# Patient Record
Sex: MALE | Race: WHITE | NOT HISPANIC OR LATINO | Employment: PART TIME | URBAN - METROPOLITAN AREA
[De-identification: names, ages, dates, MRNs, and addresses within clinical notes are randomized per-mention and may not be internally consistent; named-entity substitution may affect disease eponyms.]

---

## 2021-06-09 ENCOUNTER — HISTORICAL (OUTPATIENT)
Dept: ADMINISTRATIVE | Facility: HOSPITAL | Age: 41
End: 2021-06-09

## 2021-06-09 LAB
ABS NEUT (OLG): 5.45 X10(3)/MCL (ref 2.1–9.2)
ALBUMIN SERPL-MCNC: 4.1 GM/DL (ref 3.5–5)
ALBUMIN/GLOB SERPL: 1 RATIO (ref 1.1–2)
ALP SERPL-CCNC: 83 UNIT/L (ref 40–150)
ALT SERPL-CCNC: 94 UNIT/L (ref 0–55)
AST SERPL-CCNC: 71 UNIT/L (ref 5–34)
BASOPHILS # BLD AUTO: 0.1 X10(3)/MCL (ref 0–0.2)
BASOPHILS NFR BLD AUTO: 1 %
BILIRUB SERPL-MCNC: 0.7 MG/DL
BILIRUBIN DIRECT+TOT PNL SERPL-MCNC: 0.3 MG/DL (ref 0–0.5)
BILIRUBIN DIRECT+TOT PNL SERPL-MCNC: 0.4 MG/DL (ref 0–0.8)
BUN SERPL-MCNC: 17.8 MG/DL (ref 8.9–20.6)
CALCIUM SERPL-MCNC: 10.4 MG/DL (ref 8.4–10.2)
CHLORIDE SERPL-SCNC: 102 MMOL/L (ref 98–107)
CO2 SERPL-SCNC: 28 MMOL/L (ref 22–29)
CREAT SERPL-MCNC: 1.52 MG/DL (ref 0.73–1.18)
EOSINOPHIL # BLD AUTO: 0.1 X10(3)/MCL (ref 0–0.9)
EOSINOPHIL NFR BLD AUTO: 1 %
ERYTHROCYTE [DISTWIDTH] IN BLOOD BY AUTOMATED COUNT: 12.1 % (ref 11.5–14.5)
FERRITIN SERPL-MCNC: 172.17 NG/ML (ref 21.81–274.66)
GLOBULIN SER-MCNC: 4 GM/DL (ref 2.4–3.5)
GLUCOSE SERPL-MCNC: 96 MG/DL (ref 74–100)
HAV AB SER QL IA: NONREACTIVE
HAV IGM SERPL QL IA: NONREACTIVE
HBV CORE AB SERPL QL IA: NONREACTIVE
HBV SURFACE AB SER-ACNC: 49.97 MIU/ML
HBV SURFACE AB SERPL IA-ACNC: REACTIVE M[IU]/ML
HBV SURFACE AG SERPL QL IA: NONREACTIVE
HCT VFR BLD AUTO: 54.2 % (ref 40–51)
HGB BLD-MCNC: 17.7 GM/DL (ref 13.5–17.5)
HIV 1+2 AB+HIV1 P24 AG SERPL QL IA: NONREACTIVE
IMM GRANULOCYTES # BLD AUTO: 0.02 10*3/UL
IMM GRANULOCYTES NFR BLD AUTO: 0 %
INR PPP: 1.01 (ref 0.9–1.2)
LYMPHOCYTES # BLD AUTO: 1.9 X10(3)/MCL (ref 0.6–4.6)
LYMPHOCYTES NFR BLD AUTO: 23 %
MCH RBC QN AUTO: 29.5 PG (ref 26–34)
MCHC RBC AUTO-ENTMCNC: 32.7 GM/DL (ref 31–37)
MCV RBC AUTO: 90.5 FL (ref 80–100)
MONOCYTES # BLD AUTO: 0.8 X10(3)/MCL (ref 0.1–1.3)
MONOCYTES NFR BLD AUTO: 9 %
NEUTROPHILS # BLD AUTO: 5.45 X10(3)/MCL (ref 2.1–9.2)
NEUTROPHILS NFR BLD AUTO: 66 %
NRBC BLD AUTO-RTO: 0 % (ref 0–0.2)
PLATELET # BLD AUTO: 188 X10(3)/MCL (ref 130–400)
PMV BLD AUTO: 11.6 FL (ref 7.4–10.4)
POTASSIUM SERPL-SCNC: 4.5 MMOL/L (ref 3.5–5.1)
PROT SERPL-MCNC: 8.1 GM/DL (ref 6.4–8.3)
PROTHROMBIN TIME: 13.1 SECOND(S) (ref 11.9–14.4)
RBC # BLD AUTO: 5.99 X10(6)/MCL (ref 4.5–5.9)
SODIUM SERPL-SCNC: 138 MMOL/L (ref 136–145)
T PALLIDUM AB SER QL: NONREACTIVE
WBC # SPEC AUTO: 8.3 X10(3)/MCL (ref 4.5–11)

## 2021-06-21 ENCOUNTER — HISTORICAL (OUTPATIENT)
Dept: RADIOLOGY | Facility: HOSPITAL | Age: 41
End: 2021-06-21

## 2021-07-01 ENCOUNTER — HISTORICAL (OUTPATIENT)
Dept: ADMINISTRATIVE | Facility: HOSPITAL | Age: 41
End: 2021-07-01

## 2021-07-01 LAB
ABS NEUT (OLG): 6.15 X10(3)/MCL (ref 2.1–9.2)
ALBUMIN SERPL-MCNC: 4 GM/DL (ref 3.5–5)
ALBUMIN/GLOB SERPL: 1.1 RATIO (ref 1.1–2)
ALP SERPL-CCNC: 84 UNIT/L (ref 40–150)
ALT SERPL-CCNC: 77 UNIT/L (ref 0–55)
AST SERPL-CCNC: 60 UNIT/L (ref 5–34)
BASOPHILS # BLD AUTO: 0.1 X10(3)/MCL (ref 0–0.2)
BASOPHILS NFR BLD AUTO: 1 %
BILIRUB SERPL-MCNC: 0.4 MG/DL
BILIRUBIN DIRECT+TOT PNL SERPL-MCNC: 0.2 MG/DL (ref 0–0.5)
BILIRUBIN DIRECT+TOT PNL SERPL-MCNC: 0.2 MG/DL (ref 0–0.8)
BUN SERPL-MCNC: 14.1 MG/DL (ref 8.9–20.6)
CALCIUM SERPL-MCNC: 10 MG/DL (ref 8.4–10.2)
CHLORIDE SERPL-SCNC: 103 MMOL/L (ref 98–107)
CO2 SERPL-SCNC: 31 MMOL/L (ref 22–29)
CREAT SERPL-MCNC: 1.5 MG/DL (ref 0.73–1.18)
EOSINOPHIL # BLD AUTO: 0.1 X10(3)/MCL (ref 0–0.9)
EOSINOPHIL NFR BLD AUTO: 1 %
ERYTHROCYTE [DISTWIDTH] IN BLOOD BY AUTOMATED COUNT: 12.4 % (ref 11.5–14.5)
GLOBULIN SER-MCNC: 3.6 GM/DL (ref 2.4–3.5)
GLUCOSE SERPL-MCNC: 86 MG/DL (ref 74–100)
HCT VFR BLD AUTO: 50.4 % (ref 40–51)
HGB BLD-MCNC: 16.6 GM/DL (ref 13.5–17.5)
IMM GRANULOCYTES # BLD AUTO: 0.02 10*3/UL
IMM GRANULOCYTES NFR BLD AUTO: 0 %
LYMPHOCYTES # BLD AUTO: 2.2 X10(3)/MCL (ref 0.6–4.6)
LYMPHOCYTES NFR BLD AUTO: 23 %
MCH RBC QN AUTO: 30 PG (ref 26–34)
MCHC RBC AUTO-ENTMCNC: 32.9 GM/DL (ref 31–37)
MCV RBC AUTO: 91 FL (ref 80–100)
MONOCYTES # BLD AUTO: 1 X10(3)/MCL (ref 0.1–1.3)
MONOCYTES NFR BLD AUTO: 11 %
NEUTROPHILS # BLD AUTO: 6.15 X10(3)/MCL (ref 2.1–9.2)
NEUTROPHILS NFR BLD AUTO: 64 %
NRBC BLD AUTO-RTO: 0 % (ref 0–0.2)
PLATELET # BLD AUTO: 190 X10(3)/MCL (ref 130–400)
PMV BLD AUTO: 11.7 FL (ref 7.4–10.4)
POTASSIUM SERPL-SCNC: 4.1 MMOL/L (ref 3.5–5.1)
PROT SERPL-MCNC: 7.6 GM/DL (ref 6.4–8.3)
RBC # BLD AUTO: 5.54 X10(6)/MCL (ref 4.5–5.9)
SODIUM SERPL-SCNC: 141 MMOL/L (ref 136–145)
WBC # SPEC AUTO: 9.6 X10(3)/MCL (ref 4.5–11)

## 2023-01-01 ENCOUNTER — LAB VISIT (OUTPATIENT)
Dept: LAB | Facility: HOSPITAL | Age: 43
End: 2023-01-01
Attending: NURSE PRACTITIONER
Payer: MEDICAID

## 2023-01-01 ENCOUNTER — TELEPHONE (OUTPATIENT)
Dept: INFECTIOUS DISEASES | Facility: CLINIC | Age: 43
End: 2023-01-01
Payer: MEDICAID

## 2023-01-01 ENCOUNTER — TELEPHONE (OUTPATIENT)
Dept: TRANSPLANT | Facility: CLINIC | Age: 43
End: 2023-01-01
Payer: MEDICAID

## 2023-01-01 ENCOUNTER — HOSPITAL ENCOUNTER (OUTPATIENT)
Dept: RADIOLOGY | Facility: HOSPITAL | Age: 43
Discharge: HOME | End: 2023-03-14
Attending: STUDENT IN AN ORGANIZED HEALTH CARE EDUCATION/TRAINING PROGRAM
Payer: MEDICAID

## 2023-01-01 ENCOUNTER — OFFICE VISIT (OUTPATIENT)
Dept: UROLOGY | Facility: CLINIC | Age: 43
End: 2023-01-01
Payer: MEDICAID

## 2023-01-01 ENCOUNTER — TELEPHONE (OUTPATIENT)
Dept: UROLOGY | Facility: CLINIC | Age: 43
End: 2023-01-01
Payer: MEDICAID

## 2023-01-01 ENCOUNTER — DOCUMENTATION ONLY (OUTPATIENT)
Dept: INFECTIOUS DISEASES | Facility: CLINIC | Age: 43
End: 2023-01-01
Payer: MEDICAID

## 2023-01-01 ENCOUNTER — HOSPITAL ENCOUNTER (OUTPATIENT)
Dept: RADIOLOGY | Facility: HOSPITAL | Age: 43
Discharge: HOME | End: 2023-05-01
Attending: NURSE PRACTITIONER
Payer: MEDICAID

## 2023-01-01 ENCOUNTER — HOSPITAL ENCOUNTER (EMERGENCY)
Facility: HOSPITAL | Age: 43
End: 2023-11-10
Attending: EMERGENCY MEDICINE
Payer: MEDICAID

## 2023-01-01 ENCOUNTER — CLINICAL SUPPORT (OUTPATIENT)
Dept: INFECTIOUS DISEASES | Facility: CLINIC | Age: 43
End: 2023-01-01
Payer: MEDICAID

## 2023-01-01 ENCOUNTER — HOSPITAL ENCOUNTER (EMERGENCY)
Facility: HOSPITAL | Age: 43
Discharge: HOME | End: 2023-03-30
Attending: INTERNAL MEDICINE
Payer: MEDICAID

## 2023-01-01 ENCOUNTER — PROCEDURE VISIT (OUTPATIENT)
Dept: INFECTIOUS DISEASES | Facility: CLINIC | Age: 43
End: 2023-01-01
Payer: MEDICAID

## 2023-01-01 ENCOUNTER — OFFICE VISIT (OUTPATIENT)
Dept: INFECTIOUS DISEASES | Facility: CLINIC | Age: 43
End: 2023-01-01
Payer: MEDICAID

## 2023-01-01 ENCOUNTER — TELEPHONE (OUTPATIENT)
Dept: INFECTIOUS DISEASES | Facility: CLINIC | Age: 43
End: 2023-01-01

## 2023-01-01 ENCOUNTER — HOSPITAL ENCOUNTER (OUTPATIENT)
Facility: HOSPITAL | Age: 43
Discharge: HOME | End: 2023-06-22
Attending: INTERNAL MEDICINE | Admitting: INTERNAL MEDICINE
Payer: MEDICAID

## 2023-01-01 ENCOUNTER — OFFICE VISIT (OUTPATIENT)
Dept: INTERNAL MEDICINE | Facility: CLINIC | Age: 43
End: 2023-01-01
Payer: MEDICAID

## 2023-01-01 ENCOUNTER — HOSPITAL ENCOUNTER (OUTPATIENT)
Dept: CARDIOLOGY | Facility: HOSPITAL | Age: 43
Discharge: HOME | End: 2023-09-29
Attending: INTERNAL MEDICINE
Payer: MEDICAID

## 2023-01-01 ENCOUNTER — HOSPITAL ENCOUNTER (OUTPATIENT)
Facility: HOSPITAL | Age: 43
Discharge: HOME | End: 2023-07-20
Attending: STUDENT IN AN ORGANIZED HEALTH CARE EDUCATION/TRAINING PROGRAM | Admitting: STUDENT IN AN ORGANIZED HEALTH CARE EDUCATION/TRAINING PROGRAM
Payer: MEDICAID

## 2023-01-01 VITALS
HEART RATE: 100 BPM | BODY MASS INDEX: 27.82 KG/M2 | TEMPERATURE: 98 F | RESPIRATION RATE: 20 BRPM | WEIGHT: 177.25 LBS | SYSTOLIC BLOOD PRESSURE: 147 MMHG | HEIGHT: 67 IN | OXYGEN SATURATION: 98 % | DIASTOLIC BLOOD PRESSURE: 87 MMHG

## 2023-01-01 VITALS
DIASTOLIC BLOOD PRESSURE: 98 MMHG | SYSTOLIC BLOOD PRESSURE: 156 MMHG | WEIGHT: 178.81 LBS | HEIGHT: 67 IN | HEART RATE: 92 BPM | OXYGEN SATURATION: 99 % | TEMPERATURE: 98 F | HEART RATE: 89 BPM | RESPIRATION RATE: 16 BRPM | SYSTOLIC BLOOD PRESSURE: 159 MMHG | RESPIRATION RATE: 20 BRPM | DIASTOLIC BLOOD PRESSURE: 104 MMHG | OXYGEN SATURATION: 98 % | TEMPERATURE: 97 F | BODY MASS INDEX: 28.07 KG/M2 | WEIGHT: 175 LBS

## 2023-01-01 VITALS
SYSTOLIC BLOOD PRESSURE: 143 MMHG | HEART RATE: 84 BPM | RESPIRATION RATE: 18 BRPM | TEMPERATURE: 98 F | HEIGHT: 67 IN | DIASTOLIC BLOOD PRESSURE: 98 MMHG | WEIGHT: 171.94 LBS | BODY MASS INDEX: 26.99 KG/M2 | OXYGEN SATURATION: 99 %

## 2023-01-01 VITALS
SYSTOLIC BLOOD PRESSURE: 146 MMHG | DIASTOLIC BLOOD PRESSURE: 105 MMHG | HEIGHT: 67 IN | BODY MASS INDEX: 26.21 KG/M2 | WEIGHT: 167 LBS

## 2023-01-01 VITALS
TEMPERATURE: 98 F | WEIGHT: 168 LBS | RESPIRATION RATE: 20 BRPM | DIASTOLIC BLOOD PRESSURE: 79 MMHG | BODY MASS INDEX: 26.37 KG/M2 | OXYGEN SATURATION: 96 % | HEIGHT: 67 IN | SYSTOLIC BLOOD PRESSURE: 120 MMHG | HEART RATE: 85 BPM

## 2023-01-01 VITALS — BODY MASS INDEX: 28.25 KG/M2 | TEMPERATURE: 32 F | HEIGHT: 67 IN | WEIGHT: 180 LBS | RESPIRATION RATE: 12 BRPM

## 2023-01-01 VITALS
BODY MASS INDEX: 28.09 KG/M2 | TEMPERATURE: 98 F | HEART RATE: 96 BPM | DIASTOLIC BLOOD PRESSURE: 90 MMHG | HEIGHT: 67 IN | WEIGHT: 179 LBS | RESPIRATION RATE: 18 BRPM | SYSTOLIC BLOOD PRESSURE: 146 MMHG

## 2023-01-01 DIAGNOSIS — B18.2 CHRONIC HEPATITIS C WITHOUT HEPATIC COMA: ICD-10-CM

## 2023-01-01 DIAGNOSIS — R55 NEAR SYNCOPE: Primary | ICD-10-CM

## 2023-01-01 DIAGNOSIS — Z23 NEED FOR VACCINATION: ICD-10-CM

## 2023-01-01 DIAGNOSIS — N50.812 TESTICULAR PAIN, LEFT: ICD-10-CM

## 2023-01-01 DIAGNOSIS — I10 WHITE COAT SYNDROME WITH DIAGNOSIS OF HYPERTENSION: ICD-10-CM

## 2023-01-01 DIAGNOSIS — R55 SYNCOPE AND COLLAPSE: ICD-10-CM

## 2023-01-01 DIAGNOSIS — Z23 NEED FOR VACCINATION: Primary | ICD-10-CM

## 2023-01-01 DIAGNOSIS — N40.1 BENIGN PROSTATIC HYPERPLASIA WITH WEAK URINARY STREAM: Primary | ICD-10-CM

## 2023-01-01 DIAGNOSIS — B19.20 HEPATITIS C VIRUS INFECTION WITHOUT HEPATIC COMA, UNSPECIFIED CHRONICITY: ICD-10-CM

## 2023-01-01 DIAGNOSIS — R55 SYNCOPE: ICD-10-CM

## 2023-01-01 DIAGNOSIS — B18.2 CHRONIC HEPATITIS C WITHOUT HEPATIC COMA: Primary | ICD-10-CM

## 2023-01-01 DIAGNOSIS — Z79.899 POLYPHARMACY: ICD-10-CM

## 2023-01-01 DIAGNOSIS — R06.09 EXERTIONAL DYSPNEA: ICD-10-CM

## 2023-01-01 DIAGNOSIS — N40.1 BENIGN PROSTATIC HYPERPLASIA WITH WEAK URINARY STREAM: ICD-10-CM

## 2023-01-01 DIAGNOSIS — N50.819 PAIN IN TESTICLE, UNSPECIFIED LATERALITY: ICD-10-CM

## 2023-01-01 DIAGNOSIS — R39.12 BENIGN PROSTATIC HYPERPLASIA WITH WEAK URINARY STREAM: Primary | ICD-10-CM

## 2023-01-01 DIAGNOSIS — N30.01 ACUTE CYSTITIS WITH HEMATURIA: Primary | ICD-10-CM

## 2023-01-01 DIAGNOSIS — B19.20 HEPATITIS C VIRUS INFECTION WITHOUT HEPATIC COMA, UNSPECIFIED CHRONICITY: Primary | ICD-10-CM

## 2023-01-01 DIAGNOSIS — R06.02 SHORTNESS OF BREATH: ICD-10-CM

## 2023-01-01 DIAGNOSIS — R31.0 GROSS HEMATURIA: ICD-10-CM

## 2023-01-01 DIAGNOSIS — F15.10 METHAMPHETAMINE ABUSE: ICD-10-CM

## 2023-01-01 DIAGNOSIS — R06.82 TACHYPNEA: ICD-10-CM

## 2023-01-01 DIAGNOSIS — R07.9 CHEST PAIN: ICD-10-CM

## 2023-01-01 DIAGNOSIS — R39.12 BENIGN PROSTATIC HYPERPLASIA WITH WEAK URINARY STREAM: ICD-10-CM

## 2023-01-01 DIAGNOSIS — R79.89 ELEVATED TROPONIN: ICD-10-CM

## 2023-01-01 DIAGNOSIS — R06.09 DYSPNEA ON EXERTION: ICD-10-CM

## 2023-01-01 DIAGNOSIS — I46.9 CARDIAC ARREST: Primary | ICD-10-CM

## 2023-01-01 DIAGNOSIS — R06.02 SHORTNESS OF BREATH: Primary | ICD-10-CM

## 2023-01-01 DIAGNOSIS — R94.31 ABNORMAL EKG: ICD-10-CM

## 2023-01-01 DIAGNOSIS — R07.9 CHEST PAIN, MODERATE CORONARY ARTERY RISK: ICD-10-CM

## 2023-01-01 DIAGNOSIS — I27.9 CHRONIC PULMONARY HEART DISEASE: Primary | ICD-10-CM

## 2023-01-01 LAB
A2 MACROGLOB SERPL-MCNC: 167 MG/DL (ref 100–280)
ABS NEUT CALC (OHS): 8.12 X10(3)/MCL (ref 2.1–9.2)
ALBUMIN SERPL-MCNC: 3.5 G/DL (ref 3.5–5)
ALBUMIN SERPL-MCNC: 3.6 G/DL (ref 3.5–5)
ALBUMIN SERPL-MCNC: 3.8 G/DL (ref 3.5–5)
ALBUMIN SERPL-MCNC: 3.9 G/DL (ref 3.5–5)
ALBUMIN SERPL-MCNC: 4 G/DL (ref 3.5–5)
ALBUMIN SERPL-MCNC: 4.3 G/DL (ref 3.5–5)
ALBUMIN/GLOB SERPL: 1 RATIO (ref 1.1–2)
ALBUMIN/GLOB SERPL: 1 RATIO (ref 1.1–2)
ALBUMIN/GLOB SERPL: 1.1 RATIO (ref 1.1–2)
ALBUMIN/GLOB SERPL: 1.1 RATIO (ref 1.1–2)
ALBUMIN/GLOB SERPL: 1.2 RATIO (ref 1.1–2)
ALBUMIN/GLOB SERPL: 1.3 RATIO (ref 1.1–2)
ALP SERPL-CCNC: 110 UNIT/L (ref 40–150)
ALP SERPL-CCNC: 111 UNIT/L (ref 40–150)
ALP SERPL-CCNC: 111 UNIT/L (ref 40–150)
ALP SERPL-CCNC: 114 UNIT/L (ref 40–150)
ALP SERPL-CCNC: 118 UNIT/L (ref 40–150)
ALP SERPL-CCNC: 125 UNIT/L (ref 40–150)
ALT SERPL W P-5'-P-CCNC: 164 U/L (ref 7–55)
ALT SERPL-CCNC: 168 UNIT/L (ref 0–55)
ALT SERPL-CCNC: 18 UNIT/L (ref 0–55)
ALT SERPL-CCNC: 25 UNIT/L (ref 0–55)
ALT SERPL-CCNC: 27 UNIT/L (ref 0–55)
ALT SERPL-CCNC: 31 UNIT/L (ref 0–55)
ALT SERPL-CCNC: 72 UNIT/L (ref 0–55)
AMPHET UR QL SCN: POSITIVE
ANION GAP SERPL CALC-SCNC: 8 MEQ/L
ANNOTATION COMMENT IMP: ABNORMAL
APO A-I SERPL-MCNC: 112 MG/DL
APPEARANCE UR: ABNORMAL
APPEARANCE UR: ABNORMAL
AR ANA INTERPRETIVE COMMENT: NORMAL
AR ANTINUCLEAR ANTIBODY (ANA), HEP-2, IGG: NORMAL
AST SERPL-CCNC: 21 UNIT/L (ref 5–34)
AST SERPL-CCNC: 21 UNIT/L (ref 5–34)
AST SERPL-CCNC: 23 UNIT/L (ref 5–34)
AST SERPL-CCNC: 26 UNIT/L (ref 5–34)
AST SERPL-CCNC: 64 UNIT/L (ref 5–34)
AST SERPL-CCNC: 93 UNIT/L (ref 5–34)
AV INDEX (PROSTH): 0.74
AV INDEX (PROSTH): 0.96
AV MEAN GRADIENT: 2 MMHG
AV MEAN GRADIENT: 2 MMHG
AV PEAK GRADIENT: 3 MMHG
AV PEAK GRADIENT: 3 MMHG
AV VALVE AREA BY VELOCITY RATIO: 3.13 CM²
AV VALVE AREA: 2.95 CM²
AV VALVE AREA: 3.65 CM2
AV VELOCITY RATIO: 0.78
AV VELOCITY RATIO: 0.89
BACTERIA #/AREA URNS AUTO: ABNORMAL /HPF
BACTERIA #/AREA URNS AUTO: ABNORMAL /HPF
BACTERIA UR CULT: ABNORMAL
BACTERIA UR CULT: ABNORMAL
BARBITURATE SCN PRESENT UR: NEGATIVE
BASOPHILS # BLD AUTO: 0.04 X10(3)/MCL
BASOPHILS # BLD AUTO: 0.06 X10(3)/MCL
BASOPHILS # BLD AUTO: 0.07 X10(3)/MCL
BASOPHILS # BLD AUTO: 0.07 X10(3)/MCL (ref 0–0.2)
BASOPHILS NFR BLD AUTO: 0.5 %
BASOPHILS NFR BLD AUTO: 0.6 %
BASOPHILS NFR BLD AUTO: 0.6 %
BASOPHILS NFR BLD AUTO: 0.7 %
BASOPHILS NFR BLD MANUAL: 0.12 X10(3)/MCL (ref 0–0.2)
BASOPHILS NFR BLD MANUAL: 1 % (ref 0–2)
BENZODIAZ UR QL SCN: NEGATIVE
BILIRUB SERPL-MCNC: 0.4 MG/DL
BILIRUB UR QL STRIP.AUTO: NEGATIVE MG/DL
BILIRUB UR QL STRIP.AUTO: NEGATIVE MG/DL
BILIRUBIN DIRECT+TOT PNL SERPL-MCNC: 0.4 MG/DL
BILIRUBIN DIRECT+TOT PNL SERPL-MCNC: 0.5 MG/DL
BILIRUBIN DIRECT+TOT PNL SERPL-MCNC: 0.5 MG/DL
BILIRUBIN DIRECT+TOT PNL SERPL-MCNC: 0.6 MG/DL
BNP BLD-MCNC: 76.6 PG/ML
BSA FOR ECHO PROCEDURE: 1.89 M2
BSA FOR ECHO PROCEDURE: 1.92 M2
BUN SERPL-MCNC: 13.9 MG/DL (ref 8.9–20.6)
BUN SERPL-MCNC: 14.4 MG/DL (ref 8.9–20.6)
BUN SERPL-MCNC: 14.8 MG/DL (ref 8.9–20.6)
BUN SERPL-MCNC: 17 MG/DL (ref 8.9–20.6)
BUN SERPL-MCNC: 17.2 MG/DL (ref 8.9–20.6)
BUN SERPL-MCNC: 19.5 MG/DL (ref 8.9–20.6)
BUN SERPL-MCNC: 19.5 MG/DL (ref 8.9–20.6)
C TRACH DNA SPEC QL NAA+PROBE: NOT DETECTED
CALCIUM SERPL-MCNC: 8.9 MG/DL (ref 8.4–10.2)
CALCIUM SERPL-MCNC: 9.3 MG/DL (ref 8.4–10.2)
CALCIUM SERPL-MCNC: 9.3 MG/DL (ref 8.4–10.2)
CALCIUM SERPL-MCNC: 9.4 MG/DL (ref 8.4–10.2)
CALCIUM SERPL-MCNC: 9.4 MG/DL (ref 8.4–10.2)
CALCIUM SERPL-MCNC: 9.6 MG/DL (ref 8.4–10.2)
CALCIUM SERPL-MCNC: 9.9 MG/DL (ref 8.4–10.2)
CANNABINOIDS UR QL SCN: NEGATIVE
CHLORIDE SERPL-SCNC: 104 MMOL/L (ref 98–107)
CHLORIDE SERPL-SCNC: 106 MMOL/L (ref 98–107)
CHLORIDE SERPL-SCNC: 106 MMOL/L (ref 98–107)
CHLORIDE SERPL-SCNC: 107 MMOL/L (ref 98–107)
CHLORIDE SERPL-SCNC: 107 MMOL/L (ref 98–107)
CHLORIDE SERPL-SCNC: 108 MMOL/L (ref 98–107)
CHLORIDE SERPL-SCNC: 109 MMOL/L (ref 98–107)
CO2 SERPL-SCNC: 21 MMOL/L (ref 22–29)
CO2 SERPL-SCNC: 21 MMOL/L (ref 22–29)
CO2 SERPL-SCNC: 22 MMOL/L (ref 22–29)
CO2 SERPL-SCNC: 26 MMOL/L (ref 22–29)
CO2 SERPL-SCNC: 27 MMOL/L (ref 22–29)
CO2 SERPL-SCNC: 27 MMOL/L (ref 22–29)
CO2 SERPL-SCNC: 28 MMOL/L (ref 22–29)
COCAINE UR QL SCN: NEGATIVE
COLOR UR AUTO: ABNORMAL
COLOR UR: YELLOW
CREAT SERPL-MCNC: 1.35 MG/DL (ref 0.73–1.18)
CREAT SERPL-MCNC: 1.36 MG/DL (ref 0.73–1.18)
CREAT SERPL-MCNC: 1.45 MG/DL (ref 0.73–1.18)
CREAT SERPL-MCNC: 1.46 MG/DL (ref 0.73–1.18)
CREAT SERPL-MCNC: 1.58 MG/DL (ref 0.73–1.18)
CREAT SERPL-MCNC: 1.64 MG/DL (ref 0.73–1.18)
CREAT SERPL-MCNC: 1.75 MG/DL (ref 0.73–1.18)
CREAT/UREA NIT SERPL: 10
CRP SERPL-MCNC: <1 MG/L
CV ECHO LV RWT: 0.56 CM
CV ECHO LV RWT: 0.79 CM
D DIMER PPP IA.FEU-MCNC: 0.28 UG/ML FEU (ref 0–0.5)
DOP CALC AO PEAK VEL: 0.91 M/S
DOP CALC AO PEAK VEL: 0.92 M/S
DOP CALC AO VTI: 15.1 CM
DOP CALC AO VTI: 15.2 CM
DOP CALC LVOT AREA: 3.8 CM2
DOP CALC LVOT AREA: 4 CM2
DOP CALC LVOT DIAMETER: 2.2 CM
DOP CALC LVOT DIAMETER: 2.26 CM
DOP CALC LVOT PEAK VEL: 0.71 M/S
DOP CALC LVOT PEAK VEL: 0.82 M/S
DOP CALC LVOT STROKE VOLUME: 44.91 CM3
DOP CALC LVOT STROKE VOLUME: 55.09 CM3
DOP CALC MV VTI: 13.4 CM
DOP CALC MV VTI: 14.4 CM
DOP CALCLVOT PEAK VEL VTI: 11.2 CM
DOP CALCLVOT PEAK VEL VTI: 14.5 CM
E WAVE DECELERATION TIME: 147.79 MSEC
E WAVE DECELERATION TIME: 238 MSEC
E/A RATIO: 0.53
E/A RATIO: 0.66
E/E' RATIO: 4.63 M/S
E/E' RATIO: 6 M/S
ECHO LV POSTERIOR WALL: 1.23 CM (ref 0.6–1.1)
ECHO LV POSTERIOR WALL: 1.37 CM (ref 0.6–1.1)
EJECTION FRACTION: 55 %
EOSINOPHIL # BLD AUTO: 0.04 X10(3)/MCL (ref 0–0.9)
EOSINOPHIL # BLD AUTO: 0.08 X10(3)/MCL (ref 0–0.9)
EOSINOPHIL # BLD AUTO: 0.11 X10(3)/MCL (ref 0–0.9)
EOSINOPHIL # BLD AUTO: 0.13 X10(3)/MCL (ref 0–0.9)
EOSINOPHIL NFR BLD AUTO: 0.4 %
EOSINOPHIL NFR BLD AUTO: 0.9 %
EOSINOPHIL NFR BLD AUTO: 0.9 %
EOSINOPHIL NFR BLD AUTO: 1.6 %
EOSINOPHIL NFR BLD MANUAL: 0.12 X10(3)/MCL (ref 0–0.9)
EOSINOPHIL NFR BLD MANUAL: 1 % (ref 0–8)
ERYTHROCYTE [DISTWIDTH] IN BLOOD BY AUTOMATED COUNT: 12.3 % (ref 11.5–17)
ERYTHROCYTE [DISTWIDTH] IN BLOOD BY AUTOMATED COUNT: 12.7 % (ref 11.5–17)
ERYTHROCYTE [DISTWIDTH] IN BLOOD BY AUTOMATED COUNT: 13.2 % (ref 11.5–17)
ERYTHROCYTE [DISTWIDTH] IN BLOOD BY AUTOMATED COUNT: 13.5 % (ref 11.5–17)
ERYTHROCYTE [DISTWIDTH] IN BLOOD BY AUTOMATED COUNT: 13.5 % (ref 11.5–17)
ERYTHROCYTE [SEDIMENTATION RATE] IN BLOOD: 11 MM/HR (ref 0–15)
FENTANYL UR QL SCN: POSITIVE
FERRITIN SERPL-MCNC: 352.08 NG/ML (ref 21.81–274.66)
FIBROSIS STAGE SERPL QL: ABNORMAL
FRACTIONAL SHORTENING: 32 % (ref 28–44)
FRACTIONAL SHORTENING: 34 % (ref 28–44)
GFR SERPLBLD CREATININE-BSD FMLA CKD-EPI: 50 MLS/MIN/1.73/M2
GFR SERPLBLD CREATININE-BSD FMLA CKD-EPI: 54 MLS/MIN/1.73/M2
GFR SERPLBLD CREATININE-BSD FMLA CKD-EPI: 56 MLS/MIN/1.73/M2
GFR SERPLBLD CREATININE-BSD FMLA CKD-EPI: >60 MLS/MIN/1.73/M2
GGT SERPL-CCNC: 74 U/L (ref 8–61)
GLOBULIN SER-MCNC: 3 GM/DL (ref 2.4–3.5)
GLOBULIN SER-MCNC: 3.3 GM/DL (ref 2.4–3.5)
GLOBULIN SER-MCNC: 3.5 GM/DL (ref 2.4–3.5)
GLOBULIN SER-MCNC: 3.5 GM/DL (ref 2.4–3.5)
GLOBULIN SER-MCNC: 3.7 GM/DL (ref 2.4–3.5)
GLOBULIN SER-MCNC: 4.2 GM/DL (ref 2.4–3.5)
GLUCOSE SERPL-MCNC: 104 MG/DL (ref 74–100)
GLUCOSE SERPL-MCNC: 111 MG/DL (ref 74–100)
GLUCOSE SERPL-MCNC: 119 MG/DL (ref 74–100)
GLUCOSE SERPL-MCNC: 80 MG/DL (ref 74–100)
GLUCOSE SERPL-MCNC: 85 MG/DL (ref 74–100)
GLUCOSE SERPL-MCNC: 86 MG/DL (ref 74–100)
GLUCOSE SERPL-MCNC: 95 MG/DL (ref 74–100)
GLUCOSE UR QL STRIP.AUTO: NEGATIVE MG/DL
GLUCOSE UR QL STRIP.AUTO: NORMAL MG/DL
HAPTOGLOB SERPL NEPH-MCNC: 74 MG/DL (ref 30–200)
HAV AB SER QL IA: NONREACTIVE
HBV CORE AB SERPL QL IA: NONREACTIVE
HBV SURFACE AB SER-ACNC: 30.53 MIU/ML
HBV SURFACE AB SERPL IA-ACNC: REACTIVE M[IU]/ML
HBV SURFACE AG SERPL QL IA: NONREACTIVE
HCT VFR BLD AUTO: 47.3 % (ref 42–52)
HCT VFR BLD AUTO: 49.2 % (ref 42–52)
HCT VFR BLD AUTO: 49.5 % (ref 42–52)
HCT VFR BLD AUTO: 51.3 % (ref 42–52)
HCT VFR BLD AUTO: 56.7 % (ref 42–52)
HCV GENTYP SERPL NAA+PROBE: ABNORMAL
HCV RNA SERPL NAA+PROBE-ACNC: ABNORMAL IU/ML
HCV RNA SERPL NAA+PROBE-ACNC: NORMAL IU/ML
HGB BLD-MCNC: 15.9 G/DL (ref 14–18)
HGB BLD-MCNC: 16.5 G/DL (ref 14–18)
HGB BLD-MCNC: 16.7 G/DL (ref 14–18)
HGB BLD-MCNC: 17 G/DL (ref 14–18)
HGB BLD-MCNC: 18.8 G/DL (ref 14–18)
HIV 1+2 AB+HIV1 P24 AG SERPL QL IA: NONREACTIVE
HYALINE CASTS #/AREA URNS LPF: ABNORMAL /LPF
IMM GRANULOCYTES # BLD AUTO: 0.02 X10(3)/MCL (ref 0–0.04)
IMM GRANULOCYTES # BLD AUTO: 0.03 X10(3)/MCL (ref 0–0.04)
IMM GRANULOCYTES # BLD AUTO: 0.03 X10(3)/MCL (ref 0–0.04)
IMM GRANULOCYTES # BLD AUTO: 0.07 X10(3)/MCL (ref 0–0.04)
IMM GRANULOCYTES NFR BLD AUTO: 0.3 %
IMM GRANULOCYTES NFR BLD AUTO: 0.5 %
INR PPP: 1.3
INTERVENTRICULAR SEPTUM: 1.25 CM (ref 0.6–1.1)
INTERVENTRICULAR SEPTUM: 1.46 CM (ref 0.6–1.1)
KETONES UR QL STRIP.AUTO: NEGATIVE MG/DL
KETONES UR QL STRIP.AUTO: NEGATIVE MG/DL
LEFT ATRIUM SIZE: 3.09 CM
LEFT ATRIUM SIZE: 3.2 CM
LEFT ATRIUM VOLUME INDEX MOD: 21.5 ML/M2
LEFT ATRIUM VOLUME MOD: 40.6 CM3
LEFT CCA DIST DIAS: 14 CM/S
LEFT CCA DIST SYS: 65 CM/S
LEFT CCA PROX DIAS: 16 CM/S
LEFT CCA PROX SYS: 84 CM/S
LEFT ECA DIAS: 4 CM/S
LEFT ECA SYS: 52 CM/S
LEFT ICA DIST DIAS: 12 CM/S
LEFT ICA DIST SYS: 29 CM/S
LEFT ICA MID DIAS: 9 CM/S
LEFT ICA MID SYS: 35 CM/S
LEFT ICA PROX DIAS: 11 CM/S
LEFT ICA PROX SYS: 50 CM/S
LEFT INTERNAL DIMENSION IN SYSTOLE: 2.35 CM (ref 2.1–4)
LEFT INTERNAL DIMENSION IN SYSTOLE: 2.91 CM (ref 2.1–4)
LEFT VENTRICLE DIASTOLIC VOLUME INDEX: 26.88 ML/M2
LEFT VENTRICLE DIASTOLIC VOLUME INDEX: 47.14 ML/M2
LEFT VENTRICLE DIASTOLIC VOLUME: 50.27 ML
LEFT VENTRICLE DIASTOLIC VOLUME: 89.1 ML
LEFT VENTRICLE MASS INDEX: 107 G/M2
LEFT VENTRICLE MASS INDEX: 93 G/M2
LEFT VENTRICLE SYSTOLIC VOLUME INDEX: 10.3 ML/M2
LEFT VENTRICLE SYSTOLIC VOLUME INDEX: 17.2 ML/M2
LEFT VENTRICLE SYSTOLIC VOLUME: 19.18 ML
LEFT VENTRICLE SYSTOLIC VOLUME: 32.5 ML
LEFT VENTRICULAR INTERNAL DIMENSION IN DIASTOLE: 3.48 CM (ref 3.5–6)
LEFT VENTRICULAR INTERNAL DIMENSION IN DIASTOLE: 4.43 CM (ref 3.5–6)
LEFT VENTRICULAR MASS: 174.57 G
LEFT VENTRICULAR MASS: 202.78 G
LEFT VERTEBRAL DIAS: 8 CM/S
LEFT VERTEBRAL SYS: 31 CM/S
LEUKOCYTE ESTERASE UR QL STRIP.AUTO: 500 UNIT/L
LEUKOCYTE ESTERASE UR QL STRIP.AUTO: ABNORMAL UNIT/L
LIVER FIBR SCORE SERPL CALC.FIBROSURE: 0.3
LIVER FIBROSIS INTERPRETATION SER-IMP: ABNORMAL
LV LATERAL E/E' RATIO: 3.36 M/S
LV LATERAL E/E' RATIO: 4.5 M/S
LV SEPTAL E/E' RATIO: 7.4 M/S
LV SEPTAL E/E' RATIO: 9 M/S
LVOT MG: 1 MMHG
LVOT MG: 1.3 MMHG
LVOT MV: 0.51 CM/S
LVOT MV: 0.53 CM/S
LYMPHOCYTES # BLD AUTO: 2.11 X10(3)/MCL (ref 0.6–4.6)
LYMPHOCYTES # BLD AUTO: 2.47 X10(3)/MCL (ref 0.6–4.6)
LYMPHOCYTES # BLD AUTO: 2.76 X10(3)/MCL (ref 0.6–4.6)
LYMPHOCYTES # BLD AUTO: 3.07 X10(3)/MCL (ref 0.6–4.6)
LYMPHOCYTES NFR BLD AUTO: 19.6 %
LYMPHOCYTES NFR BLD AUTO: 21.2 %
LYMPHOCYTES NFR BLD AUTO: 26.5 %
LYMPHOCYTES NFR BLD AUTO: 44.5 %
LYMPHOCYTES NFR BLD MANUAL: 28 % (ref 13–40)
LYMPHOCYTES NFR BLD MANUAL: 3.44 X10(3)/MCL
MAGNESIUM SERPL-MCNC: 1.9 MG/DL (ref 1.6–2.6)
MAGNESIUM SERPL-MCNC: 2 MG/DL (ref 1.6–2.6)
MAGNESIUM SERPL-MCNC: 2 MG/DL (ref 1.6–2.6)
MCH RBC QN AUTO: 29.2 PG (ref 27–31)
MCH RBC QN AUTO: 29.6 PG (ref 27–31)
MCH RBC QN AUTO: 29.7 PG (ref 27–31)
MCH RBC QN AUTO: 29.8 PG (ref 27–31)
MCH RBC QN AUTO: 30.1 PG (ref 27–31)
MCHC RBC AUTO-ENTMCNC: 33.1 G/DL (ref 33–36)
MCHC RBC AUTO-ENTMCNC: 33.2 G/DL (ref 33–36)
MCHC RBC AUTO-ENTMCNC: 33.3 G/DL (ref 33–36)
MCHC RBC AUTO-ENTMCNC: 33.6 G/DL (ref 33–36)
MCHC RBC AUTO-ENTMCNC: 33.9 G/DL (ref 33–36)
MCV RBC AUTO: 87.9 FL (ref 80–94)
MCV RBC AUTO: 88.2 FL (ref 80–94)
MCV RBC AUTO: 88.2 FL (ref 80–94)
MCV RBC AUTO: 89.4 FL (ref 80–94)
MCV RBC AUTO: 90.2 FL (ref 80–94)
MDMA UR QL SCN: NEGATIVE
MONOCYTES # BLD AUTO: 0.64 X10(3)/MCL (ref 0.1–1.3)
MONOCYTES # BLD AUTO: 0.76 X10(3)/MCL (ref 0.1–1.3)
MONOCYTES # BLD AUTO: 0.79 X10(3)/MCL (ref 0.1–1.3)
MONOCYTES # BLD AUTO: 1.16 X10(3)/MCL (ref 0.1–1.3)
MONOCYTES NFR BLD AUTO: 7.7 %
MONOCYTES NFR BLD AUTO: 8.3 %
MONOCYTES NFR BLD AUTO: 8.5 %
MONOCYTES NFR BLD AUTO: 9.3 %
MONOCYTES NFR BLD MANUAL: 0.49 X10(3)/MCL (ref 0.1–1.3)
MONOCYTES NFR BLD MANUAL: 4 % (ref 2–11)
MV MEAN GRADIENT: 1 MMHG
MV MEAN GRADIENT: 1 MMHG
MV PEAK A VEL: 0.56 M/S
MV PEAK A VEL: 0.68 M/S
MV PEAK E VEL: 0.36 M/S
MV PEAK E VEL: 0.37 M/S
MV PEAK GRADIENT: 2 MMHG
MV PEAK GRADIENT: 3 MMHG
MV STENOSIS PRESSURE HALF TIME: 42.86 MS
MV VALVE AREA BY CONTINUITY EQUATION: 3.35 CM2
MV VALVE AREA BY CONTINUITY EQUATION: 3.83 CM2
MV VALVE AREA P 1/2 METHOD: 5.13 CM2
N GONORRHOEA DNA SPEC QL NAA+PROBE: NOT DETECTED
NECROINFLAMMATORY ACT GRADE SERPL QL: ABNORMAL
NECROINFLAMMATORY ACT SCORE SERPL: 0.75
NECROINFLAMMATORY ACTIV INTERP SER-IMP: ABNORMAL
NEUTROPHILS # BLD AUTO: 3.02 X10(3)/MCL (ref 2.1–9.2)
NEUTROPHILS # BLD AUTO: 5.88 X10(3)/MCL (ref 2.1–9.2)
NEUTROPHILS # BLD AUTO: 6.92 X10(3)/MCL (ref 2.1–9.2)
NEUTROPHILS # BLD AUTO: 9.87 X10(3)/MCL (ref 2.1–9.2)
NEUTROPHILS NFR BLD AUTO: 43.7 %
NEUTROPHILS NFR BLD AUTO: 63.2 %
NEUTROPHILS NFR BLD AUTO: 69.7 %
NEUTROPHILS NFR BLD AUTO: 70.2 %
NEUTROPHILS NFR BLD MANUAL: 66 % (ref 47–80)
NITRITE UR QL STRIP.AUTO: NEGATIVE
NITRITE UR QL STRIP.AUTO: POSITIVE
NRBC BLD AUTO-RTO: 0 %
OHS CV CAROTID RIGHT ICA EDV HIGHEST: 9
OHS CV CAROTID ULTRASOUND LEFT ICA/CCA RATIO: 0.77
OHS CV CAROTID ULTRASOUND RIGHT ICA/CCA RATIO: 0.38
OHS CV PV CAROTID LEFT HIGHEST CCA: 84
OHS CV PV CAROTID LEFT HIGHEST ICA: 50
OHS CV PV CAROTID RIGHT HIGHEST CCA: 94
OHS CV PV CAROTID RIGHT HIGHEST ICA: 36
OHS CV US CAROTID LEFT HIGHEST EDV: 12
OHS LV EJECTION FRACTION SIMPSONS BIPLANE MOD: 5 %
OHS LV EJECTION FRACTION SIMPSONS BIPLANE MOD: 54 %
OPIATES UR QL SCN: NEGATIVE
PCP UR QL: NEGATIVE
PH UR STRIP.AUTO: 5.5 [PH]
PH UR STRIP.AUTO: 6 [PH]
PH UR: 5.5 [PH] (ref 3–11)
PHOSPHATE SERPL-MCNC: 3.8 MG/DL (ref 2.3–4.7)
PISA TR MAX VEL: 4.53 M/S
PISA TR MAX VEL: 5.8 M/S
PLATELET # BLD AUTO: 141 X10(3)/MCL (ref 130–400)
PLATELET # BLD AUTO: 146 X10(3)/MCL (ref 130–400)
PLATELET # BLD AUTO: 172 X10(3)/MCL (ref 130–400)
PLATELET # BLD AUTO: 173 X10(3)/MCL (ref 130–400)
PLATELET # BLD AUTO: 197 X10(3)/MCL (ref 130–400)
PLATELET # BLD EST: ADEQUATE 10*3/UL
PMV BLD AUTO: 11.9 FL (ref 7.4–10.4)
PMV BLD AUTO: 12.5 FL (ref 7.4–10.4)
PMV BLD AUTO: 12.7 FL (ref 7.4–10.4)
PMV BLD AUTO: 12.7 FL (ref 7.4–10.4)
PMV BLD AUTO: 13.3 FL (ref 7.4–10.4)
POCT GLUCOSE: 256 MG/DL (ref 70–110)
POTASSIUM SERPL-SCNC: 3.7 MMOL/L (ref 3.5–5.1)
POTASSIUM SERPL-SCNC: 3.7 MMOL/L (ref 3.5–5.1)
POTASSIUM SERPL-SCNC: 4.1 MMOL/L (ref 3.5–5.1)
POTASSIUM SERPL-SCNC: 4.1 MMOL/L (ref 3.5–5.1)
POTASSIUM SERPL-SCNC: 4.2 MMOL/L (ref 3.5–5.1)
POTASSIUM SERPL-SCNC: 4.4 MMOL/L (ref 3.5–5.1)
POTASSIUM SERPL-SCNC: 4.6 MMOL/L (ref 3.5–5.1)
PROT SERPL-MCNC: 6.8 GM/DL (ref 6.4–8.3)
PROT SERPL-MCNC: 6.8 GM/DL (ref 6.4–8.3)
PROT SERPL-MCNC: 7.1 GM/DL (ref 6.4–8.3)
PROT SERPL-MCNC: 7.6 GM/DL (ref 6.4–8.3)
PROT SERPL-MCNC: 7.8 GM/DL (ref 6.4–8.3)
PROT SERPL-MCNC: 8.2 GM/DL (ref 6.4–8.3)
PROT UR QL STRIP.AUTO: ABNORMAL MG/DL
PROT UR QL STRIP.AUTO: ABNORMAL MG/DL
PROTHROMBIN TIME: 15.7 SECONDS (ref 11.4–14)
PSA SERPL-MCNC: 3.86 NG/ML
RA MAJOR: 4.72 CM
RA PRESSURE ESTIMATED: 8 MMHG
RA PRESSURE: 15 MMHG
RA WIDTH: 4.29 CM
RBC # BLD AUTO: 5.36 X10(6)/MCL (ref 4.7–6.1)
RBC # BLD AUTO: 5.49 X10(6)/MCL (ref 4.7–6.1)
RBC # BLD AUTO: 5.6 X10(6)/MCL (ref 4.7–6.1)
RBC # BLD AUTO: 5.74 X10(6)/MCL (ref 4.7–6.1)
RBC # BLD AUTO: 6.43 X10(6)/MCL (ref 4.7–6.1)
RBC #/AREA URNS AUTO: ABNORMAL /HPF
RBC #/AREA URNS AUTO: ABNORMAL /HPF
RBC MORPH BLD: NORMAL
RBC UR QL AUTO: ABNORMAL UNIT/L
RBC UR QL AUTO: NEGATIVE UNIT/L
RIGHT CCA DIST DIAS: 0 CM/S
RIGHT CCA DIST SYS: 94 CM/S
RIGHT CCA PROX DIAS: 10 CM/S
RIGHT CCA PROX SYS: 66 CM/S
RIGHT ECA DIAS: 2 CM/S
RIGHT ECA SYS: 69 CM/S
RIGHT ICA DIST DIAS: 9 CM/S
RIGHT ICA DIST SYS: 23 CM/S
RIGHT ICA MID DIAS: 8 CM/S
RIGHT ICA MID SYS: 24 CM/S
RIGHT ICA PROX DIAS: 9 CM/S
RIGHT ICA PROX SYS: 36 CM/S
RIGHT VENTRICULAR END-DIASTOLIC DIMENSION: 3.55 CM
RIGHT VENTRICULAR END-DIASTOLIC DIMENSION: 3.92 CM
RIGHT VERTEBRAL DIAS: 0 CM/S
RIGHT VERTEBRAL SYS: 23 CM/S
RV MID DIAMA: 4.03 CM
RV TB RVSP: 14 MMHG
SERIAL #: ABNORMAL
SODIUM SERPL-SCNC: 138 MMOL/L (ref 136–145)
SODIUM SERPL-SCNC: 139 MMOL/L (ref 136–145)
SODIUM SERPL-SCNC: 140 MMOL/L (ref 136–145)
SODIUM SERPL-SCNC: 140 MMOL/L (ref 136–145)
SODIUM SERPL-SCNC: 141 MMOL/L (ref 136–145)
SP GR UR STRIP.AUTO: 1.02
SP GR UR STRIP.AUTO: 1.02 (ref 1–1.03)
SPECIFIC GRAVITY, URINE AUTO (.000) (OHS): 1.02 (ref 1–1.03)
SQUAMOUS #/AREA URNS AUTO: ABNORMAL /HPF
SQUAMOUS #/AREA URNS LPF: ABNORMAL /HPF
T PALLIDUM AB SER QL: NONREACTIVE
TDI LATERAL: 0.08 M/S
TDI LATERAL: 0.11 M/S
TDI SEPTAL: 0.04 M/S
TDI SEPTAL: 0.05 M/S
TDI: 0.06 M/S
TDI: 0.08 M/S
TESTOST SERPL-MCNC: 538.34 NG/DL (ref 240.24–870.68)
TR MAX PG: 135 MMHG
TR MAX PG: 82 MMHG
TRICUSPID ANNULAR PLANE SYSTOLIC EXCURSION: 1.97 CM
TROPONIN I SERPL-MCNC: 0.02 NG/ML (ref 0–0.04)
TROPONIN I SERPL-MCNC: 0.03 NG/ML (ref 0–0.04)
TROPONIN I SERPL-MCNC: 0.04 NG/ML (ref 0–0.04)
TROPONIN I SERPL-MCNC: 0.05 NG/ML (ref 0–0.04)
TROPONIN I SERPL-MCNC: <0.01 NG/ML (ref 0–0.04)
TV REST PULMONARY ARTERY PRESSURE: 143 MMHG
TV REST PULMONARY ARTERY PRESSURE: 97 MMHG
UROBILINOGEN UR STRIP-ACNC: 1 MG/DL
UROBILINOGEN UR STRIP-ACNC: ABNORMAL MG/DL
WBC # SPEC AUTO: 12.3 X10(3)/MCL (ref 4.5–11.5)
WBC # SPEC AUTO: 14.1 X10(3)/MCL (ref 4.5–11.5)
WBC # SPEC AUTO: 6.9 X10(3)/MCL (ref 4.5–11.5)
WBC # SPEC AUTO: 9.31 X10(3)/MCL (ref 4.5–11.5)
WBC # SPEC AUTO: 9.93 X10(3)/MCL (ref 4.5–11.5)
WBC #/AREA URNS AUTO: >100 /HPF
WBC #/AREA URNS AUTO: ABNORMAL /HPF
YEAST BUDDING URNS QL: ABNORMAL /HPF
Z-SCORE OF LEFT VENTRICULAR DIMENSION IN END DIASTOLE: -4.04
Z-SCORE OF LEFT VENTRICULAR DIMENSION IN END SYSTOLE: -2.48

## 2023-01-01 PROCEDURE — 25500020 PHARM REV CODE 255: Performed by: STUDENT IN AN ORGANIZED HEALTH CARE EDUCATION/TRAINING PROGRAM

## 2023-01-01 PROCEDURE — 99214 PR OFFICE/OUTPT VISIT, EST, LEVL IV, 30-39 MIN: ICD-10-PCS | Mod: S$PBB,,, | Performed by: NURSE PRACTITIONER

## 2023-01-01 PROCEDURE — 1160F PR REVIEW ALL MEDS BY PRESCRIBER/CLIN PHARMACIST DOCUMENTED: ICD-10-PCS | Mod: CPTII,95,, | Performed by: NURSE PRACTITIONER

## 2023-01-01 PROCEDURE — 99215 OFFICE O/P EST HI 40 MIN: CPT | Mod: PBBFAC | Performed by: NURSE PRACTITIONER

## 2023-01-01 PROCEDURE — 1159F PR MEDICATION LIST DOCUMENTED IN MEDICAL RECORD: ICD-10-PCS | Mod: CPTII,95,, | Performed by: NURSE PRACTITIONER

## 2023-01-01 PROCEDURE — 25000003 PHARM REV CODE 250: Performed by: STUDENT IN AN ORGANIZED HEALTH CARE EDUCATION/TRAINING PROGRAM

## 2023-01-01 PROCEDURE — 31500 INSERT EMERGENCY AIRWAY: CPT

## 2023-01-01 PROCEDURE — 1159F MED LIST DOCD IN RCRD: CPT | Mod: CPTII,95,, | Performed by: NURSE PRACTITIONER

## 2023-01-01 PROCEDURE — 96372 THER/PROPH/DIAG INJ SC/IM: CPT | Mod: 59 | Performed by: PHYSICIAN ASSISTANT

## 2023-01-01 PROCEDURE — 93010 EKG 12-LEAD: ICD-10-PCS | Mod: 76,,, | Performed by: INTERNAL MEDICINE

## 2023-01-01 PROCEDURE — 85652 RBC SED RATE AUTOMATED: CPT | Performed by: PHYSICIAN ASSISTANT

## 2023-01-01 PROCEDURE — 99214 OFFICE O/P EST MOD 30 MIN: CPT | Mod: 95,,, | Performed by: NURSE PRACTITIONER

## 2023-01-01 PROCEDURE — 84100 ASSAY OF PHOSPHORUS: CPT | Performed by: NURSE PRACTITIONER

## 2023-01-01 PROCEDURE — 76870 US EXAM SCROTUM: CPT | Mod: TC

## 2023-01-01 PROCEDURE — G0378 HOSPITAL OBSERVATION PER HR: HCPCS

## 2023-01-01 PROCEDURE — 81001 URINALYSIS AUTO W/SCOPE: CPT | Performed by: INTERNAL MEDICINE

## 2023-01-01 PROCEDURE — 3079F PR MOST RECENT DIASTOLIC BLOOD PRESSURE 80-89 MM HG: ICD-10-PCS | Mod: CPTII,,, | Performed by: NURSE PRACTITIONER

## 2023-01-01 PROCEDURE — 82728 ASSAY OF FERRITIN: CPT

## 2023-01-01 PROCEDURE — 87591 N.GONORRHOEAE DNA AMP PROB: CPT | Performed by: INTERNAL MEDICINE

## 2023-01-01 PROCEDURE — 84484 ASSAY OF TROPONIN QUANT: CPT | Mod: 91 | Performed by: PHYSICIAN ASSISTANT

## 2023-01-01 PROCEDURE — 63600175 PHARM REV CODE 636 W HCPCS: Performed by: EMERGENCY MEDICINE

## 2023-01-01 PROCEDURE — 63600175 PHARM REV CODE 636 W HCPCS: Performed by: STUDENT IN AN ORGANIZED HEALTH CARE EDUCATION/TRAINING PROGRAM

## 2023-01-01 PROCEDURE — 1160F PR REVIEW ALL MEDS BY PRESCRIBER/CLIN PHARMACIST DOCUMENTED: ICD-10-PCS | Mod: CPTII,,, | Performed by: NURSE PRACTITIONER

## 2023-01-01 PROCEDURE — 1159F MED LIST DOCD IN RCRD: CPT | Mod: CPTII,,, | Performed by: NURSE PRACTITIONER

## 2023-01-01 PROCEDURE — C1751 CATH, INF, PER/CENT/MIDLINE: HCPCS | Performed by: STUDENT IN AN ORGANIZED HEALTH CARE EDUCATION/TRAINING PROGRAM

## 2023-01-01 PROCEDURE — 1160F RVW MEDS BY RX/DR IN RCRD: CPT | Mod: CPTII,95,, | Performed by: NURSE PRACTITIONER

## 2023-01-01 PROCEDURE — 3008F BODY MASS INDEX DOCD: CPT | Mod: CPTII,,, | Performed by: NURSE PRACTITIONER

## 2023-01-01 PROCEDURE — 83735 ASSAY OF MAGNESIUM: CPT | Performed by: NURSE PRACTITIONER

## 2023-01-01 PROCEDURE — 80053 COMPREHEN METABOLIC PANEL: CPT

## 2023-01-01 PROCEDURE — 83735 ASSAY OF MAGNESIUM: CPT | Performed by: PHYSICIAN ASSISTANT

## 2023-01-01 PROCEDURE — 80053 COMPREHEN METABOLIC PANEL: CPT | Performed by: PHYSICIAN ASSISTANT

## 2023-01-01 PROCEDURE — 85025 COMPLETE CBC W/AUTO DIFF WBC: CPT

## 2023-01-01 PROCEDURE — 99214 PR OFFICE/OUTPT VISIT, EST, LEVL IV, 30-39 MIN: ICD-10-PCS | Mod: 95,,, | Performed by: NURSE PRACTITIONER

## 2023-01-01 PROCEDURE — 1159F PR MEDICATION LIST DOCUMENTED IN MEDICAL RECORD: ICD-10-PCS | Mod: CPTII,,, | Performed by: NURSE PRACTITIONER

## 2023-01-01 PROCEDURE — 1160F RVW MEDS BY RX/DR IN RCRD: CPT | Mod: CPTII,,, | Performed by: NURSE PRACTITIONER

## 2023-01-01 PROCEDURE — 85025 COMPLETE CBC W/AUTO DIFF WBC: CPT | Performed by: NURSE PRACTITIONER

## 2023-01-01 PROCEDURE — 96365 THER/PROPH/DIAG IV INF INIT: CPT

## 2023-01-01 PROCEDURE — 80053 COMPREHEN METABOLIC PANEL: CPT | Performed by: INTERNAL MEDICINE

## 2023-01-01 PROCEDURE — 86039 ANTINUCLEAR ANTIBODIES (ANA): CPT | Mod: 90

## 2023-01-01 PROCEDURE — 99285 EMERGENCY DEPT VISIT HI MDM: CPT | Mod: 25

## 2023-01-01 PROCEDURE — 86704 HEP B CORE ANTIBODY TOTAL: CPT

## 2023-01-01 PROCEDURE — 3077F SYST BP >= 140 MM HG: CPT | Mod: CPTII,,, | Performed by: NURSE PRACTITIONER

## 2023-01-01 PROCEDURE — 93010 ELECTROCARDIOGRAM REPORT: CPT | Mod: 76,,, | Performed by: INTERNAL MEDICINE

## 2023-01-01 PROCEDURE — 99285 EMERGENCY DEPT VISIT HI MDM: CPT

## 2023-01-01 PROCEDURE — 99214 OFFICE O/P EST MOD 30 MIN: CPT | Mod: S$PBB,,, | Performed by: NURSE PRACTITIONER

## 2023-01-01 PROCEDURE — 83880 ASSAY OF NATRIURETIC PEPTIDE: CPT | Performed by: NURSE PRACTITIONER

## 2023-01-01 PROCEDURE — 80048 BASIC METABOLIC PNL TOTAL CA: CPT | Performed by: INTERNAL MEDICINE

## 2023-01-01 PROCEDURE — 91200 LIVER ELASTOGRAPHY: CPT | Mod: PBBFAC | Performed by: STUDENT IN AN ORGANIZED HEALTH CARE EDUCATION/TRAINING PROGRAM

## 2023-01-01 PROCEDURE — 63600175 PHARM REV CODE 636 W HCPCS: Performed by: INTERNAL MEDICINE

## 2023-01-01 PROCEDURE — 87522 HEPATITIS C REVRS TRNSCRPJ: CPT | Mod: 90

## 2023-01-01 PROCEDURE — 86708 HEPATITIS A ANTIBODY: CPT

## 2023-01-01 PROCEDURE — 87522 HEPATITIS C REVRS TRNSCRPJ: CPT

## 2023-01-01 PROCEDURE — 63600175 PHARM REV CODE 636 W HCPCS: Performed by: NURSE PRACTITIONER

## 2023-01-01 PROCEDURE — 25000003 PHARM REV CODE 250: Performed by: NURSE PRACTITIONER

## 2023-01-01 PROCEDURE — 90632 HEPA VACCINE ADULT IM: CPT | Mod: PBBFAC

## 2023-01-01 PROCEDURE — 36415 COLL VENOUS BLD VENIPUNCTURE: CPT

## 2023-01-01 PROCEDURE — 3077F PR MOST RECENT SYSTOLIC BLOOD PRESSURE >= 140 MM HG: ICD-10-PCS | Mod: CPTII,,, | Performed by: NURSE PRACTITIONER

## 2023-01-01 PROCEDURE — 93005 ELECTROCARDIOGRAM TRACING: CPT

## 2023-01-01 PROCEDURE — 87902 NFCT AGT GNTYP ALYS HEP C: CPT | Mod: 90

## 2023-01-01 PROCEDURE — G0390 TRAUMA RESPONS W/HOSP CRITI: HCPCS | Performed by: EMERGENCY MEDICINE

## 2023-01-01 PROCEDURE — 99214 OFFICE O/P EST MOD 30 MIN: CPT | Mod: PBBFAC | Performed by: NURSE PRACTITIONER

## 2023-01-01 PROCEDURE — 87340 HEPATITIS B SURFACE AG IA: CPT

## 2023-01-01 PROCEDURE — 96376 TX/PRO/DX INJ SAME DRUG ADON: CPT

## 2023-01-01 PROCEDURE — 80053 COMPREHEN METABOLIC PANEL: CPT | Performed by: NURSE PRACTITIONER

## 2023-01-01 PROCEDURE — 71046 X-RAY EXAM CHEST 2 VIEWS: CPT | Mod: TC

## 2023-01-01 PROCEDURE — 96366 THER/PROPH/DIAG IV INF ADDON: CPT

## 2023-01-01 PROCEDURE — 85025 COMPLETE CBC W/AUTO DIFF WBC: CPT | Performed by: INTERNAL MEDICINE

## 2023-01-01 PROCEDURE — 86140 C-REACTIVE PROTEIN: CPT | Performed by: PHYSICIAN ASSISTANT

## 2023-01-01 PROCEDURE — 3080F DIAST BP >= 90 MM HG: CPT | Mod: CPTII,,, | Performed by: NURSE PRACTITIONER

## 2023-01-01 PROCEDURE — 85379 FIBRIN DEGRADATION QUANT: CPT | Performed by: PHYSICIAN ASSISTANT

## 2023-01-01 PROCEDURE — 99214 OFFICE O/P EST MOD 30 MIN: CPT | Mod: PBBFAC,25

## 2023-01-01 PROCEDURE — 87186 SC STD MICRODIL/AGAR DIL: CPT | Mod: 59 | Performed by: NURSE PRACTITIONER

## 2023-01-01 PROCEDURE — 99152 MOD SED SAME PHYS/QHP 5/>YRS: CPT | Performed by: STUDENT IN AN ORGANIZED HEALTH CARE EDUCATION/TRAINING PROGRAM

## 2023-01-01 PROCEDURE — 80307 DRUG TEST PRSMV CHEM ANLYZR: CPT | Performed by: NURSE PRACTITIONER

## 2023-01-01 PROCEDURE — 96365 THER/PROPH/DIAG IV INF INIT: CPT | Mod: 59

## 2023-01-01 PROCEDURE — 85027 COMPLETE CBC AUTOMATED: CPT

## 2023-01-01 PROCEDURE — 3079F DIAST BP 80-89 MM HG: CPT | Mod: CPTII,,, | Performed by: NURSE PRACTITIONER

## 2023-01-01 PROCEDURE — 87088 URINE BACTERIA CULTURE: CPT | Performed by: INTERNAL MEDICINE

## 2023-01-01 PROCEDURE — 3080F PR MOST RECENT DIASTOLIC BLOOD PRESSURE >= 90 MM HG: ICD-10-PCS | Mod: CPTII,,, | Performed by: NURSE PRACTITIONER

## 2023-01-01 PROCEDURE — 25000003 PHARM REV CODE 250: Performed by: EMERGENCY MEDICINE

## 2023-01-01 PROCEDURE — 84484 ASSAY OF TROPONIN QUANT: CPT | Performed by: NURSE PRACTITIONER

## 2023-01-01 PROCEDURE — 63600175 PHARM REV CODE 636 W HCPCS: Performed by: PHYSICIAN ASSISTANT

## 2023-01-01 PROCEDURE — 99153 MOD SED SAME PHYS/QHP EA: CPT | Performed by: STUDENT IN AN ORGANIZED HEALTH CARE EDUCATION/TRAINING PROGRAM

## 2023-01-01 PROCEDURE — 3008F PR BODY MASS INDEX (BMI) DOCUMENTED: ICD-10-PCS | Mod: CPTII,,, | Performed by: NURSE PRACTITIONER

## 2023-01-01 PROCEDURE — 86706 HEP B SURFACE ANTIBODY: CPT

## 2023-01-01 PROCEDURE — 99204 OFFICE O/P NEW MOD 45 MIN: CPT | Mod: S$PBB,,, | Performed by: NURSE PRACTITIONER

## 2023-01-01 PROCEDURE — 81596 NFCT DS CHRNC HCV 6 ASSAYS: CPT | Mod: 90

## 2023-01-01 PROCEDURE — 99204 PR OFFICE/OUTPT VISIT, NEW, LEVL IV, 45-59 MIN: ICD-10-PCS | Mod: S$PBB,,, | Performed by: NURSE PRACTITIONER

## 2023-01-01 PROCEDURE — 87088 URINE BACTERIA CULTURE: CPT | Performed by: NURSE PRACTITIONER

## 2023-01-01 PROCEDURE — 84153 ASSAY OF PSA TOTAL: CPT

## 2023-01-01 PROCEDURE — C1769 GUIDE WIRE: HCPCS | Performed by: STUDENT IN AN ORGANIZED HEALTH CARE EDUCATION/TRAINING PROGRAM

## 2023-01-01 PROCEDURE — 90471 IMMUNIZATION ADMIN: CPT | Mod: PBBFAC

## 2023-01-01 PROCEDURE — 25000003 PHARM REV CODE 250: Performed by: PHYSICIAN ASSISTANT

## 2023-01-01 PROCEDURE — 81001 URINALYSIS AUTO W/SCOPE: CPT | Performed by: NURSE PRACTITIONER

## 2023-01-01 PROCEDURE — 87186 SC STD MICRODIL/AGAR DIL: CPT | Mod: 59 | Performed by: INTERNAL MEDICINE

## 2023-01-01 PROCEDURE — 93460 R&L HRT ART/VENTRICLE ANGIO: CPT | Performed by: STUDENT IN AN ORGANIZED HEALTH CARE EDUCATION/TRAINING PROGRAM

## 2023-01-01 PROCEDURE — 85610 PROTHROMBIN TIME: CPT

## 2023-01-01 PROCEDURE — 84403 ASSAY OF TOTAL TESTOSTERONE: CPT

## 2023-01-01 PROCEDURE — 27201423 OPTIME MED/SURG SUP & DEVICES STERILE SUPPLY: Performed by: STUDENT IN AN ORGANIZED HEALTH CARE EDUCATION/TRAINING PROGRAM

## 2023-01-01 PROCEDURE — C1894 INTRO/SHEATH, NON-LASER: HCPCS | Performed by: STUDENT IN AN ORGANIZED HEALTH CARE EDUCATION/TRAINING PROGRAM

## 2023-01-01 PROCEDURE — 25000003 PHARM REV CODE 250: Performed by: INTERNAL MEDICINE

## 2023-01-01 PROCEDURE — 86780 TREPONEMA PALLIDUM: CPT

## 2023-01-01 PROCEDURE — 99211 OFF/OP EST MAY X REQ PHY/QHP: CPT | Mod: PBBFAC

## 2023-01-01 PROCEDURE — 93010 ELECTROCARDIOGRAM REPORT: CPT | Mod: ,,, | Performed by: INTERNAL MEDICINE

## 2023-01-01 PROCEDURE — 87389 HIV-1 AG W/HIV-1&-2 AB AG IA: CPT

## 2023-01-01 PROCEDURE — 93306 TTE W/DOPPLER COMPLETE: CPT

## 2023-01-01 PROCEDURE — 96374 THER/PROPH/DIAG INJ IV PUSH: CPT

## 2023-01-01 RX ORDER — ENOXAPARIN SODIUM 100 MG/ML
1 INJECTION SUBCUTANEOUS
Status: COMPLETED | OUTPATIENT
Start: 2023-01-01 | End: 2023-01-01

## 2023-01-01 RX ORDER — TAMSULOSIN HYDROCHLORIDE 0.4 MG/1
0.4 CAPSULE ORAL DAILY
Qty: 90 CAPSULE | Refills: 3 | Status: SHIPPED | OUTPATIENT
Start: 2023-01-01 | End: 2024-04-26

## 2023-01-01 RX ORDER — EPINEPHRINE 0.1 MG/ML
INJECTION INTRAVENOUS CODE/TRAUMA/SEDATION MEDICATION
Status: COMPLETED | OUTPATIENT
Start: 2023-01-01 | End: 2023-01-01

## 2023-01-01 RX ORDER — SIMETHICONE 80 MG
1 TABLET,CHEWABLE ORAL 4 TIMES DAILY PRN
Status: DISCONTINUED | OUTPATIENT
Start: 2023-01-01 | End: 2023-01-01 | Stop reason: HOSPADM

## 2023-01-01 RX ORDER — AMLODIPINE BESYLATE 5 MG/1
10 TABLET ORAL DAILY
Qty: 30 TABLET | Refills: 2 | Status: SHIPPED | OUTPATIENT
Start: 2023-01-01

## 2023-01-01 RX ORDER — ACETAMINOPHEN 325 MG/1
650 TABLET ORAL EVERY 6 HOURS PRN
Status: DISCONTINUED | OUTPATIENT
Start: 2023-01-01 | End: 2023-01-01 | Stop reason: HOSPADM

## 2023-01-01 RX ORDER — SODIUM BICARBONATE 1 MEQ/ML
SYRINGE (ML) INTRAVENOUS CODE/TRAUMA/SEDATION MEDICATION
Status: COMPLETED | OUTPATIENT
Start: 2023-01-01 | End: 2023-01-01

## 2023-01-01 RX ORDER — NITROGLYCERIN 0.4 MG/1
0.4 TABLET SUBLINGUAL EVERY 5 MIN PRN
Status: DISCONTINUED | OUTPATIENT
Start: 2023-01-01 | End: 2023-01-01 | Stop reason: HOSPADM

## 2023-01-01 RX ORDER — SODIUM CHLORIDE 9 MG/ML
INJECTION, SOLUTION INTRAVENOUS CONTINUOUS
Status: DISCONTINUED | OUTPATIENT
Start: 2023-01-01 | End: 2023-01-01 | Stop reason: HOSPADM

## 2023-01-01 RX ORDER — CIPROFLOXACIN 500 MG/1
500 TABLET ORAL 2 TIMES DAILY
Qty: 20 TABLET | Refills: 0 | Status: SHIPPED | OUTPATIENT
Start: 2023-01-01 | End: 2023-01-01

## 2023-01-01 RX ORDER — DIAZEPAM 5 MG/1
10 TABLET ORAL
Status: ACTIVE | OUTPATIENT
Start: 2023-01-01

## 2023-01-01 RX ORDER — ONDANSETRON 2 MG/ML
INJECTION INTRAMUSCULAR; INTRAVENOUS
Status: DISCONTINUED | OUTPATIENT
Start: 2023-01-01 | End: 2023-01-01 | Stop reason: HOSPADM

## 2023-01-01 RX ORDER — PROCHLORPERAZINE EDISYLATE 5 MG/ML
5 INJECTION INTRAMUSCULAR; INTRAVENOUS EVERY 6 HOURS PRN
Status: DISCONTINUED | OUTPATIENT
Start: 2023-01-01 | End: 2023-01-01 | Stop reason: HOSPADM

## 2023-01-01 RX ORDER — DIPHENHYDRAMINE HCL 25 MG
50 CAPSULE ORAL
Status: ACTIVE | OUTPATIENT
Start: 2023-01-01

## 2023-01-01 RX ORDER — CLONIDINE HYDROCHLORIDE 0.1 MG/1
0.2 TABLET ORAL
Status: COMPLETED | OUTPATIENT
Start: 2023-01-01 | End: 2023-01-01

## 2023-01-01 RX ORDER — ACETAMINOPHEN 500 MG
1 TABLET ORAL DAILY
Qty: 1 EACH | Refills: 0 | Status: SHIPPED | OUTPATIENT
Start: 2023-01-01 | End: 2023-01-01

## 2023-01-01 RX ORDER — NITROFURANTOIN (MACROCRYSTALS) 100 MG/1
100 CAPSULE ORAL EVERY 12 HOURS
Qty: 14 CAPSULE | Refills: 0 | Status: SHIPPED | OUTPATIENT
Start: 2023-01-01 | End: 2023-01-01

## 2023-01-01 RX ORDER — AMLODIPINE BESYLATE 5 MG/1
10 TABLET ORAL
Status: COMPLETED | OUTPATIENT
Start: 2023-01-01 | End: 2023-01-01

## 2023-01-01 RX ORDER — FENTANYL CITRATE 50 UG/ML
INJECTION, SOLUTION INTRAMUSCULAR; INTRAVENOUS
Status: DISCONTINUED | OUTPATIENT
Start: 2023-01-01 | End: 2023-01-01 | Stop reason: HOSPADM

## 2023-01-01 RX ORDER — TAMSULOSIN HYDROCHLORIDE 0.4 MG/1
0.4 CAPSULE ORAL DAILY
Status: DISCONTINUED | OUTPATIENT
Start: 2023-01-01 | End: 2023-01-01 | Stop reason: HOSPADM

## 2023-01-01 RX ORDER — DOXYCYCLINE 100 MG/1
100 CAPSULE ORAL 2 TIMES DAILY
Qty: 20 CAPSULE | Refills: 0 | Status: SHIPPED | OUTPATIENT
Start: 2023-01-01 | End: 2023-01-01

## 2023-01-01 RX ORDER — ASPIRIN 325 MG
325 TABLET, DELAYED RELEASE (ENTERIC COATED) ORAL
Status: COMPLETED | OUTPATIENT
Start: 2023-01-01 | End: 2023-01-01

## 2023-01-01 RX ORDER — VELPATASVIR AND SOFOSBUVIR 100; 400 MG/1; MG/1
1 TABLET, FILM COATED ORAL DAILY
Status: DISCONTINUED | OUTPATIENT
Start: 2023-01-01 | End: 2023-01-01 | Stop reason: HOSPADM

## 2023-01-01 RX ORDER — MAG HYDROX/ALUMINUM HYD/SIMETH 200-200-20
30 SUSPENSION, ORAL (FINAL DOSE FORM) ORAL 4 TIMES DAILY PRN
Status: DISCONTINUED | OUTPATIENT
Start: 2023-01-01 | End: 2023-01-01 | Stop reason: HOSPADM

## 2023-01-01 RX ORDER — AMLODIPINE BESYLATE 5 MG/1
10 TABLET ORAL DAILY
Qty: 30 TABLET | Refills: 2 | Status: SHIPPED | OUTPATIENT
Start: 2023-01-01 | End: 2023-01-01 | Stop reason: SDUPTHER

## 2023-01-01 RX ORDER — LIDOCAINE HYDROCHLORIDE 10 MG/ML
INJECTION, SOLUTION EPIDURAL; INFILTRATION; INTRACAUDAL; PERINEURAL
Status: DISCONTINUED | OUTPATIENT
Start: 2023-01-01 | End: 2023-01-01 | Stop reason: HOSPADM

## 2023-01-01 RX ORDER — VELPATASVIR AND SOFOSBUVIR 100; 400 MG/1; MG/1
1 TABLET, FILM COATED ORAL DAILY
Qty: 84 TABLET | Refills: 0 | Status: SHIPPED | OUTPATIENT
Start: 2023-01-01 | End: 2023-01-01 | Stop reason: SDUPTHER

## 2023-01-01 RX ORDER — MORPHINE SULFATE 4 MG/ML
2 INJECTION, SOLUTION INTRAMUSCULAR; INTRAVENOUS EVERY 4 HOURS PRN
Status: DISCONTINUED | OUTPATIENT
Start: 2023-01-01 | End: 2023-01-01 | Stop reason: HOSPADM

## 2023-01-01 RX ORDER — POLYETHYLENE GLYCOL 3350 17 G/17G
17 POWDER, FOR SOLUTION ORAL 2 TIMES DAILY PRN
Status: DISCONTINUED | OUTPATIENT
Start: 2023-01-01 | End: 2023-01-01 | Stop reason: HOSPADM

## 2023-01-01 RX ORDER — HYDROXYZINE PAMOATE 50 MG/1
50 CAPSULE ORAL DAILY PRN
Status: ON HOLD | COMMUNITY
Start: 2022-09-24 | End: 2023-01-01 | Stop reason: HOSPADM

## 2023-01-01 RX ORDER — MIDAZOLAM HYDROCHLORIDE 1 MG/ML
INJECTION, SOLUTION INTRAMUSCULAR; INTRAVENOUS
Status: DISCONTINUED | OUTPATIENT
Start: 2023-01-01 | End: 2023-01-01 | Stop reason: HOSPADM

## 2023-01-01 RX ORDER — TALC
6 POWDER (GRAM) TOPICAL NIGHTLY PRN
Status: DISCONTINUED | OUTPATIENT
Start: 2023-01-01 | End: 2023-01-01 | Stop reason: HOSPADM

## 2023-01-01 RX ORDER — ACETAMINOPHEN 325 MG/1
650 TABLET ORAL EVERY 4 HOURS PRN
Status: DISCONTINUED | OUTPATIENT
Start: 2023-01-01 | End: 2023-01-01 | Stop reason: HOSPADM

## 2023-01-01 RX ORDER — AMLODIPINE BESYLATE 5 MG/1
10 TABLET ORAL DAILY
Status: DISCONTINUED | OUTPATIENT
Start: 2023-01-01 | End: 2023-01-01 | Stop reason: HOSPADM

## 2023-01-01 RX ORDER — CARVEDILOL 3.12 MG/1
3.12 TABLET ORAL 2 TIMES DAILY WITH MEALS
COMMUNITY

## 2023-01-01 RX ORDER — ONDANSETRON 4 MG/1
8 TABLET, ORALLY DISINTEGRATING ORAL EVERY 8 HOURS PRN
Status: DISCONTINUED | OUTPATIENT
Start: 2023-01-01 | End: 2023-01-01 | Stop reason: HOSPADM

## 2023-01-01 RX ORDER — ONDANSETRON 2 MG/ML
4 INJECTION INTRAMUSCULAR; INTRAVENOUS EVERY 4 HOURS PRN
Status: DISCONTINUED | OUTPATIENT
Start: 2023-01-01 | End: 2023-01-01 | Stop reason: HOSPADM

## 2023-01-01 RX ORDER — HYDROCODONE BITARTRATE AND ACETAMINOPHEN 5; 325 MG/1; MG/1
1 TABLET ORAL EVERY 4 HOURS PRN
Status: DISCONTINUED | OUTPATIENT
Start: 2023-01-01 | End: 2023-01-01 | Stop reason: HOSPADM

## 2023-01-01 RX ORDER — CLONIDINE HYDROCHLORIDE 0.1 MG/1
0.1 TABLET ORAL NIGHTLY
COMMUNITY
Start: 2022-09-22 | End: 2023-01-01

## 2023-01-01 RX ORDER — SODIUM CHLORIDE, SODIUM LACTATE, POTASSIUM CHLORIDE, CALCIUM CHLORIDE 600; 310; 30; 20 MG/100ML; MG/100ML; MG/100ML; MG/100ML
INJECTION, SOLUTION INTRAVENOUS CONTINUOUS
Status: DISCONTINUED | OUTPATIENT
Start: 2023-01-01 | End: 2023-01-01 | Stop reason: HOSPADM

## 2023-01-01 RX ORDER — VELPATASVIR AND SOFOSBUVIR 100; 400 MG/1; MG/1
1 TABLET, FILM COATED ORAL DAILY
Qty: 28 TABLET | Refills: 0 | Status: SHIPPED | OUTPATIENT
Start: 2023-01-01

## 2023-01-01 RX ORDER — AMLODIPINE BESYLATE 5 MG/1
5 TABLET ORAL DAILY
COMMUNITY
Start: 2022-10-02 | End: 2023-01-01 | Stop reason: SDUPTHER

## 2023-01-01 RX ORDER — NAPROXEN 500 MG/1
500 TABLET ORAL 2 TIMES DAILY PRN
Qty: 15 TABLET | Refills: 0 | Status: SHIPPED | OUTPATIENT
Start: 2023-01-01 | End: 2023-01-01

## 2023-01-01 RX ORDER — HYDRALAZINE HYDROCHLORIDE 20 MG/ML
10 INJECTION INTRAMUSCULAR; INTRAVENOUS EVERY 4 HOURS PRN
Status: DISCONTINUED | OUTPATIENT
Start: 2023-01-01 | End: 2023-01-01 | Stop reason: HOSPADM

## 2023-01-01 RX ORDER — NALOXONE HCL 0.4 MG/ML
0.02 VIAL (ML) INJECTION
Status: DISCONTINUED | OUTPATIENT
Start: 2023-01-01 | End: 2023-01-01 | Stop reason: HOSPADM

## 2023-01-01 RX ORDER — SODIUM CHLORIDE 9 MG/ML
INJECTION, SOLUTION INTRAVENOUS ONCE
Status: ACTIVE | OUTPATIENT
Start: 2023-01-01

## 2023-01-01 RX ORDER — SODIUM CHLORIDE 0.9 % (FLUSH) 0.9 %
10 SYRINGE (ML) INJECTION
Status: ACTIVE | OUTPATIENT
Start: 2023-01-01

## 2023-01-01 RX ADMIN — TAMSULOSIN HYDROCHLORIDE 0.4 MG: 0.4 CAPSULE ORAL at 11:06

## 2023-01-01 RX ADMIN — TAMSULOSIN HYDROCHLORIDE 0.4 MG: 0.4 CAPSULE ORAL at 08:06

## 2023-01-01 RX ADMIN — PIPERACILLIN AND TAZOBACTAM 4.5 G: 4; .5 INJECTION, POWDER, LYOPHILIZED, FOR SOLUTION INTRAVENOUS; PARENTERAL at 09:06

## 2023-01-01 RX ADMIN — AMLODIPINE BESYLATE 10 MG: 5 TABLET ORAL at 08:06

## 2023-01-01 RX ADMIN — ASPIRIN 325 MG: 325 TABLET, COATED ORAL at 09:06

## 2023-01-01 RX ADMIN — SODIUM CHLORIDE, POTASSIUM CHLORIDE, SODIUM LACTATE AND CALCIUM CHLORIDE: 600; 310; 30; 20 INJECTION, SOLUTION INTRAVENOUS at 11:06

## 2023-01-01 RX ADMIN — EPINEPHRINE 1 MG: 0.1 INJECTION INTRAVENOUS at 10:11

## 2023-01-01 RX ADMIN — PIPERACILLIN AND TAZOBACTAM 4.5 G: 4; .5 INJECTION, POWDER, LYOPHILIZED, FOR SOLUTION INTRAVENOUS; PARENTERAL at 01:06

## 2023-01-01 RX ADMIN — PIPERACILLIN AND TAZOBACTAM 4.5 G: 4; .5 INJECTION, POWDER, LYOPHILIZED, FOR SOLUTION INTRAVENOUS; PARENTERAL at 05:06

## 2023-01-01 RX ADMIN — CLONIDINE HYDROCHLORIDE 0.2 MG: 0.1 TABLET ORAL at 11:03

## 2023-01-01 RX ADMIN — ENOXAPARIN SODIUM 80 MG: 80 INJECTION SUBCUTANEOUS at 09:06

## 2023-01-01 RX ADMIN — CEFTRIAXONE SODIUM 1 G: 1 INJECTION, POWDER, FOR SOLUTION INTRAMUSCULAR; INTRAVENOUS at 01:03

## 2023-01-01 RX ADMIN — SODIUM CHLORIDE 1000 ML: 9 INJECTION, SOLUTION INTRAVENOUS at 09:06

## 2023-01-01 RX ADMIN — VELPATASVIR AND SOFOSBUVIR 1 TABLET: 100; 400 TABLET, FILM COATED ORAL at 08:06

## 2023-01-01 RX ADMIN — AMLODIPINE BESYLATE 10 MG: 5 TABLET ORAL at 09:06

## 2023-01-01 RX ADMIN — AMLODIPINE BESYLATE 10 MG: 5 TABLET ORAL at 11:06

## 2023-01-01 RX ADMIN — SODIUM BICARBONATE 50 MEQ: 84 INJECTION INTRAVENOUS at 10:11

## 2023-01-01 RX ADMIN — ACETAMINOPHEN 650 MG: 325 TABLET ORAL at 03:06

## 2023-01-01 RX ADMIN — VELPATASVIR AND SOFOSBUVIR 1 TABLET: 100; 400 TABLET, FILM COATED ORAL at 01:06
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
## 2023-03-30 NOTE — ED NOTES
Hypertensive. States was prescribed meds for HTN just last weeks but has not taken because he forgets.

## 2023-03-30 NOTE — ED PROVIDER NOTES
Encounter Date: 3/29/2023       History     Chief Complaint   Patient presents with    Hematuria    Shortness of Breath    Bladder Pain    PELVIC PRESSURE     B/P   on  left  arm   190 146     Presents with right flank pain and hematuria today. Denies fever, nausea, vomiting or injury. Pain is sharp, moderate, non radiating    The history is provided by the patient.   Review of patient's allergies indicates:  No Known Allergies  No past medical history on file.  No past surgical history on file.  No family history on file.  Social History     Tobacco Use    Smoking status: Never    Smokeless tobacco: Never   Substance Use Topics    Alcohol use: Not Currently     Alcohol/week: 1.0 standard drink     Types: 1 Cans of beer per week     Comment: ocassionally    Drug use: Yes     Frequency: 7.0 times per week     Types: Methamphetamines     Review of Systems   Constitutional:  Negative for fever.   HENT:  Negative for sore throat.    Respiratory:  Positive for shortness of breath.    Cardiovascular:  Negative for chest pain.   Gastrointestinal:  Negative for nausea.   Genitourinary:  Positive for flank pain and hematuria. Negative for dysuria.   Musculoskeletal:  Negative for back pain.   Skin:  Negative for rash.   Neurological:  Negative for weakness.   Hematological:  Does not bruise/bleed easily.   All other systems reviewed and are negative.    Physical Exam     Initial Vitals [03/29/23 2326]   BP Pulse Resp Temp SpO2   (!) 183/126 104 20 97.3 °F (36.3 °C) 98 %      MAP       --         Physical Exam    Nursing note and vitals reviewed.  Constitutional: He appears well-developed and well-nourished. No distress.   HENT:   Head: Normocephalic and atraumatic.   Mouth/Throat: Oropharynx is clear and moist.   Eyes: Conjunctivae are normal. Pupils are equal, round, and reactive to light.   Neck: Neck supple.   Normal range of motion.  Cardiovascular:  Regular rhythm, normal heart sounds and intact distal pulses.            Pulmonary/Chest: Breath sounds normal. No respiratory distress.   Abdominal: Abdomen is soft. Bowel sounds are normal. He exhibits no distension. There is no abdominal tenderness. There is no rebound and no guarding.   Genitourinary:    Genitourinary Comments: No CVT     Musculoskeletal:         General: No edema. Normal range of motion.      Cervical back: Normal range of motion and neck supple.     Neurological: He is alert and oriented to person, place, and time. He has normal strength. GCS score is 15. GCS eye subscore is 4. GCS verbal subscore is 5. GCS motor subscore is 6.   Skin: Skin is warm and dry. No rash noted.   Psychiatric: His behavior is normal.       ED Course   Procedures  Labs Reviewed   COMPREHENSIVE METABOLIC PANEL - Abnormal; Notable for the following components:       Result Value    Glucose Level 111 (*)     Creatinine 1.35 (*)     Albumin/Globulin Ratio 1.0 (*)     Alanine Aminotransferase 72 (*)     Aspartate Aminotransferase 64 (*)     All other components within normal limits   URINALYSIS - Abnormal; Notable for the following components:    Appearance, UA Turbid (*)     Protein, UA 1+ (*)     Blood, UA 3+ (*)     Urobilinogen, UA 2+ (*)     Leukocyte Esterase,  (*)     WBC, UA >100 (*)     Budding Yeast, UA Few (*)     RBC, UA 50-99 (*)     All other components within normal limits   CBC WITH DIFFERENTIAL - Abnormal; Notable for the following components:    WBC 14.1 (*)     MPV 12.5 (*)     Neut # 9.87 (*)     IG# 0.07 (*)     All other components within normal limits   CULTURE, URINE   CHLAMYDIA/GONORRHOEAE(GC), PCR   CBC W/ AUTO DIFFERENTIAL    Narrative:     The following orders were created for panel order CBC auto differential.  Procedure                               Abnormality         Status                     ---------                               -----------         ------                     CBC with Differential[[id number]]        Abnormal            Final result                  Please view results for these tests on the individual orders.          Imaging Results              CT Abdomen Pelvis  Without Contrast (Preliminary result)  Result time 03/30/23 01:36:59      Preliminary result by Adriano Gutierrez Jr., MD (03/30/23 01:36:59)                   Narrative:    START OF REPORT:  TECHNIQUE: CT OF THE ABDOMEN AND PELVIS WAS PERFORMED WITH AXIAL IMAGES AS WELL AS SAGITTAL AND CORONAL RECONSTRUCTION IMAGES WITHOUT INTRAVENOUS CONTRAST.    COMPARISON: NONE AVAILABLE.    CLINICAL HISTORY: FLANK PAIN, KIDNEY STONE SUSPECTED.    DOSAGE INFORMATION: AUTOMATED EXPOSURE CONTROL WAS UTILIZED.    Findings:  Thorax:  Lungs: There is nonspecific dependent change at the lung bases.  Pleura: No effusions or thickening.  Heart: The heart size is within normal limits.  Abdomen:  Abdominal Wall: No abdominal wall pathology is seen.  Liver: The liver appears unremarkable.  Biliary System: No intrahepatic or extrahepatic biliary duct dilatation is seen.  Gallbladder: The gallbladder appears unremarkable.  Pancreas: The pancreas appears unremarkable.  Spleen: The spleen appears unremarkable.  Adrenals: The adrenal glands appear unremarkable.  Kidneys: The kidneys appear unremarkable with no stones cysts masses or hydronephrosis on this noncontrast study.  Aorta: The abdominal aorta appears unremarkable.  IVC: Unremarkable.  Bowel:  Esophagus: The visualized esophagus appears unremarkable.  Stomach: The stomach appears unremarkable.  Duodenum: Unremarkable appearing duodenum.  Small Bowel: The small bowel appears unremarkable.  Colon: Nondistended.  Appendix: The appendix appears unremarkable.  Peritoneum: No intraperitoneal free air or ascites is seen.    Pelvis:  Bladder: The bladder appears unremarkable.  Male:  Prostate gland: The prostate gland appears unremarkable.    Bony structures:  Dorsal Spine: There is mild spondylosis of the visualized dorsal spine. Pars interarticularis defects are  noted at L5 bilaterally. Multilevel Schmorls nodes are identified.  Bony Pelvis: Postoperative changes are seen in the right hip.      Impression:  1. No renal / ureteral calculus or ureteral obstruction.  2. No acute intraabdominal or pelvic pathology is identified. Details and findings as discussed.                          Preliminary result by Interface, Rad Results In (03/30/23 01:36:59)                   Narrative:    START OF REPORT:  TECHNIQUE: CT OF THE ABDOMEN AND PELVIS WAS PERFORMED WITH AXIAL IMAGES AS WELL AS SAGITTAL AND CORONAL RECONSTRUCTION IMAGES WITHOUT INTRAVENOUS CONTRAST.    COMPARISON: NONE AVAILABLE.    CLINICAL HISTORY: FLANK PAIN, KIDNEY STONE SUSPECTED.    DOSAGE INFORMATION: AUTOMATED EXPOSURE CONTROL WAS UTILIZED.    Findings:  Thorax:  Lungs: There is nonspecific dependent change at the lung bases.  Pleura: No effusions or thickening.  Heart: The heart size is within normal limits.  Abdomen:  Abdominal Wall: No abdominal wall pathology is seen.  Liver: The liver appears unremarkable.  Biliary System: No intrahepatic or extrahepatic biliary duct dilatation is seen.  Gallbladder: The gallbladder appears unremarkable.  Pancreas: The pancreas appears unremarkable.  Spleen: The spleen appears unremarkable.  Adrenals: The adrenal glands appear unremarkable.  Kidneys: The kidneys appear unremarkable with no stones cysts masses or hydronephrosis on this noncontrast study.  Aorta: The abdominal aorta appears unremarkable.  IVC: Unremarkable.  Bowel:  Esophagus: The visualized esophagus appears unremarkable.  Stomach: The stomach appears unremarkable.  Duodenum: Unremarkable appearing duodenum.  Small Bowel: The small bowel appears unremarkable.  Colon: Nondistended.  Appendix: The appendix appears unremarkable.  Peritoneum: No intraperitoneal free air or ascites is seen.    Pelvis:  Bladder: The bladder appears unremarkable.  Male:  Prostate gland: The prostate gland appears  unremarkable.    Bony structures:  Dorsal Spine: There is mild spondylosis of the visualized dorsal spine. Pars interarticularis defects are noted at L5 bilaterally. Multilevel Schmorls nodes are identified.  Bony Pelvis: Postoperative changes are seen in the right hip.      Impression:  1. No renal / ureteral calculus or ureteral obstruction.  2. No acute intraabdominal or pelvic pathology is identified. Details and findings as discussed.                                      X-Rays:   Independently Interpreted Readings:   Abdomen: Liver: Normal. Gallbladder: Normal. Stomach: Normal. Pancreas: Normal.Large Bowel: Normal. Small Bowel: Normal. Vessels: Normal. Bladder: Normal. Moderate constipation, No hydronephrosis or renal stones   Medications   cefTRIAXone (ROCEPHIN) 1 g in dextrose 5 % in water (D5W) 5 % 50 mL IVPB (MB+) (1 g Intravenous New Bag 3/30/23 0150)   cloNIDine tablet 0.2 mg (0.2 mg Oral Given 3/29/23 7322)     Medical Decision Making:   History:   Old Medical Records: I decided to obtain old medical records.  Old Records Summarized: records from clinic visits and records from previous admission(s).       <> Summary of Records: Recent evaluation for chest pain with unremarkable workup including CXR, BNP, TSH, CMP an dCBC  Clinical Tests:   Lab Tests: Ordered                        Clinical Impression:   Final diagnoses:  [N30.01] Acute cystitis with hematuria (Primary)  [I10] White coat syndrome with diagnosis of hypertension        ED Disposition Condition    Discharge Stable          ED Prescriptions       Medication Sig Dispense Start Date End Date Auth. Provider    ciprofloxacin HCl (CIPRO) 500 MG tablet Take 1 tablet (500 mg total) by mouth 2 (two) times daily. for 10 days 20 tablet 3/30/2023 4/9/2023 Wander Bahena MD    doxycycline (VIBRAMYCIN) 100 MG Cap Take 1 capsule (100 mg total) by mouth 2 (two) times daily. for 10 days 20 capsule 3/30/2023 4/9/2023 Wander Ann  MD Josef    naproxen (NAPROSYN) 500 MG tablet Take 1 tablet (500 mg total) by mouth 2 (two) times daily as needed (Pain). 15 tablet 3/30/2023 -- Wander Bahena MD          Follow-up Information       Follow up With Specialties Details Why Contact Info    Jonatan Limon DO Internal Medicine In 2 weeks  [address] [facility] [id number] [phone]      [facility] Emergency Medicine  If symptoms worsen [address] [facility] [address] [phone]             Wander Bahena MD  03/30/23 0153       Wander Bahena MD  03/30/23 0216

## 2023-04-03 NOTE — PROGRESS NOTES
Results discussed with GENIA Morrell orders to stop taking Cipro start Macrobid 100 mg po bid for 5 days.  Attempt to contact no answer left vm

## 2023-04-03 NOTE — PROGRESS NOTES
Results discussed with IVAN CORMIER orders to stop taking Cipro start Macrobid 100 mg po for 5 days. Attempt to contact patient no answer left vm

## 2023-04-19 NOTE — PROCEDURES
Fibroscan Procedure     Name: Renny Dow  Date of Procedure :   Interpreting Physician: Oli Lenz MD  Diagnosis: HCV    Probe: M    Fibroscan readin.1 kPa    Fibrosis: F 0-1     CAP readin dB/m    Steatosis: S0      Miscellaneous:
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
## 2023-04-20 NOTE — PROGRESS NOTES
Pt came to clinic today for Epclusa teaching.  Pt was given Epclusa, by me.  Explained Hep  C summary form, Epclusa one tablet daily, make sure to have next month's Epclusa available before running out, increase fluids to help with possible side effects of headaches and fatique.  Okay to take ibuprofen for headaches, no alcohol intake, and before starting any new scripts, call our clinic or the pharmacy.  Notified that labs and follow up visits are due every 4 weeks while on treatment.  Hep C summary form, lab orders/dates given to patient.  Patient verbalized understanding of all the above information.

## 2023-04-27 PROBLEM — N40.1 BENIGN PROSTATIC HYPERPLASIA WITH WEAK URINARY STREAM: Status: ACTIVE | Noted: 2023-01-01

## 2023-04-27 PROBLEM — R31.0 GROSS HEMATURIA: Status: ACTIVE | Noted: 2023-01-01

## 2023-04-27 PROBLEM — N50.812 TESTICULAR PAIN, LEFT: Status: ACTIVE | Noted: 2023-01-01

## 2023-04-27 PROBLEM — R39.12 BENIGN PROSTATIC HYPERPLASIA WITH WEAK URINARY STREAM: Status: ACTIVE | Noted: 2023-01-01

## 2023-04-27 NOTE — PROGRESS NOTES
Swelling noted to both legs and feet. SANCHO Nicholson made aware. C/O numbness also to lower extremities.  ED visit recommended. Patient understood reason for recommendation.
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
## 2023-05-23 NOTE — TELEPHONE ENCOUNTER
Pt had a virtual visit withour provider today, which we had been trying to get in touch with patient to let him know that we will have to transfer Epclusa Rx and luckily we were able to tell him that his Rx will be transferred to [facility].  Office note and facesheet faxed to  and provider escribed rx to [facility].

## 2023-05-23 NOTE — PROGRESS NOTES
Subjective     Patient ID: Renny Dow is a 42 y.o. male.    Chief Complaint: Followup Hep C (Denies problems)    Patient and provider are located in the [facility].    Face to Face time with patient:  30 minutes of total time spent on the encounter, which includes face to face time and non-face to face time preparing to see the patient (eg, review of tests), Obtaining and/or reviewing separately obtained history, Documenting clinical information in the electronic or other health record, Independently interpreting results (not separately reported) and communicating results to the patient/family/caregiver, or Care coordination (not separately reported).     Each patient to whom he or she provides medical services by telemedicine is:  (1) informed of the relationship between the physician and patient and the respective role of any other health care provider with respect to management of the patient; and (2) notified that he or she may decline to receive medical services by telemedicine and may withdraw from such care at any time.      5/23/23  Renny is a 41 yo WM evaluated today via audio-video virtual visit for HCV f/u. He started Epclusa 12 week treatment course on 4/20/23 & is taking it daily.  He has not noted any side effects.  He did increase water intake, but is still not at goal.  He has not yet entered rehab as discussed, still under consideration. FibroScan completed 4/19/23, resulted S0, F0-1. He has not yet come in for week 4 labs, agrees to come today for same.  All questions answered & concerns addressed.    4/19/23  Renny is a 41 yo WM referred to [facility] by PCP Dr. Limon 3/23 for HCV infection, diagnosed 5/2021.  He is treatment naive.  He tells me that he is still actively using meth, but is ready to quit and has plans to go into rehab.  He has been to rehab in the past, stayed clean for a couple of years then relapsed.  He last did IVDU 1 week ago.  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 No history of blood transfusion or known HCV + sexual contacts. He is sexually active, currently with only one partner.  MSF & does not use condoms. Does not drink alcohol.  DOC methamphetamine, last use 4/18/23.  He also endorses recently starting to inject steroids.  Advised to discontinue, agrees to do so. He is amenable to Hep A vaccination.  Does not drink water, agrees to start doing after looking at lab results. He is eager to start treatment & agrees to adhere to recommended treatment protocol.  Appreciates opportunity for virtual visits, especially given his plan to enter rehab treatment program.  All questions answered & concerns addressed.    Review of Systems   Constitutional: Negative.    HENT: Negative.     Respiratory: Negative.     Cardiovascular: Negative.    Gastrointestinal: Negative.    Genitourinary: Negative.    Integumentary:  Negative.   Neurological: Negative.    Hematological: Negative.    Psychiatric/Behavioral: Negative.          Objective     Physical Exam  Constitutional:       General: He is not in acute distress.     Appearance: Normal appearance.   HENT:      Head: Normocephalic.   Eyes:      Conjunctiva/sclera: Conjunctivae normal.   Pulmonary:      Effort: Pulmonary effort is normal.   Musculoskeletal:         General: Normal range of motion.      Cervical back: Normal range of motion.   Neurological:      General: No focal deficit present.      Mental Status: He is alert and oriented to person, place, and time. Mental status is at baseline.   Psychiatric:         Mood and Affect: Mood normal.         Behavior: Behavior normal.         Thought Content: Thought content normal.         Judgment: Judgment normal.          Assessment and Plan     Problem List Items Addressed This Visit    None    Chronic hepatitis C without hepatic coma  -     sofosbuvir-velpatasvir (EPCLUSA) 400-100 mg Tab; Take 1 tablet by mouth once daily.  Dispense: 28 tablet; Refill:  0    Diagnosed 2021.  Treatment naive.  GT 1a, baseline VL 4.56 mil.  Fibrosure: 4/12/23 A3, F1.  FibroScan 4/19/23 S0, F0-1.  CT abd 3/30/23: Liver WNL.  Blood precautions: do not share a razor, needle, toothbrush, clippers with anyone.  Continue Epclusa 1 po daily x 12 weeks started 4/20/23.   Week 4 labs to be collected today.  Increase water intake.  Stop use of all illicit drugs.   RTC as scheduled with Christina.  Declines need for PrEP at this time.

## 2023-06-06 NOTE — TELEPHONE ENCOUNTER
Spoke with patient and explained to him that [facility] has been trying to contact him to decide on a delivery date for his last month of Epclusa.  Gave patient the number to [facility].  Pt states he will call today.

## 2023-06-06 NOTE — TELEPHONE ENCOUNTER
Spoke with Nerissa @ [facility], who just wanted to verify that pt needed only 1 more month of Epclusa.  Verified that patient only needed last month of Epclusa.  Also pharmacy has been trying to contact patient to decide on delivery date, but can't get in touch with him. Verified patient's numbers.  Informed Nerissa that we would try to contact patient, also.

## 2023-06-06 NOTE — TELEPHONE ENCOUNTER
----- Message from Kirstin August sent at 6/5/2023  3:55 PM CDT -----  Regarding: Pharmacy call  [place]/[facility] asking for a call back regarding pts epclusa therapy. [phone]

## 2023-06-06 NOTE — TELEPHONE ENCOUNTER
Left message for patient to call back to let him know that [facility] is trying to get in touch with him and give him their number.([phone]).  Tried to contact alternative number, friend, Afsaneh, but no answer and no voicemail.
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
## 2023-06-21 NOTE — PROGRESS NOTES
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  Notes his last use of meth was yesterday.  Work-up in the ER revealed a sinus tachycardia with ischemic EKG changes represented by T wave inversions in II, III and aVF and V1 through V4 and ST depressions in V2 and V3.  The repeat EKG showed more prominence of the T wave inversions that extended to the lateral leads.  The patient will be admitted as observation and monitored closely on telemetry.  [facility] is been consulted for further evaluation we will obtain an echocardiogram in the morning and we agree with trending troponins and given 1 dose of Lovenox 1 mg/kg x 1 tonight.

## 2023-06-21 NOTE — PROGRESS NOTES
[facility]  Hospital Medicine Progress Note        Chief Complaint   Shortness of Breath, Chest Pain, and Leg Swelling (Pt presents c/o lower extremity swelling with CP/SOB.  onset x 6 months with syncopal episode today.  Denies hitting head.  )        History of Present Illness   Mr. Dow is a 42 year old male with a  pmh of htn, hepatitis, BPH, and IVDU who presented to the Ed with c/o intermittent SOB, chest pain, and leg swelling starting 6 months ago, getting progressively worse and more frequent.  He states that today he was working outside for approximately 10 minutes picking up branches when all of a sudden he felt like he couldn't catch his breath and then had a syncopal episode. He denies any chest pain at that time.     Today's ED lab work revealed creatinine 1.64.  Original troponin was 0.046, 4 hour repeat was 0.02.  His EKG showed T-wave inversions in II, III, AVF, V1 through V4, ST depressions in V2 and V3.  UDS positive for amphetamines and fentanyl.  UA showed 4+ bacteria, 21-50 WBC, 1+ Leukocytes, and positive nitrites. CXR negative for acute pulmonary process.  ED vitals:  Temp 97.6° F, HR 86, resp 20, /115, SpO2 97% on RA.  Cardiology was consulted in the ED. He was admitted to hospital medicine for management.      This patient was admitted for syncope and collapse after IV drug use with amphetamines and fentanyl    Today's information   Patient seen and examined at bedside   Patient currently has no complaints his chest pain has resolved   Vitals reviewed and stable on room air   Creatinine is improving on IV fluids currently at 1.36   Troponin curve is flat   Urine cultures growing Gram-negative newton       Exam  General: Well developed, well nourished. In no acute distress, non-toxic appearing. Appears comfortable  HEENT: NC/AT  Neck:  Supple  Chest: Clear bilaterally, no wheezing or rales, no accessory muscle use   CVS: Regular rhythm. Normal  S1/S2.  Abdomen: nondistended, normoactive BS, soft and non-tender.  MSK: No obvious deformity or joint swelling  Skin: Warm and dry  Neuro: AAOx3, no focal neurological deficit  Psych: Cooperative       Assessment & Plan   Chest pain/Elevated Troponin  Presyncope  HTN- uncontrolled  Hepatitis C  Elevated BMI  IV Drug Use  Pulmonary Hypertension  NAVA  Acute cystitis, POA- gnr       History:  htn, hepatitis, BPH, and IVDU     Plan:  Chest pain has currently resolved.    Follow-up with [facility] recommendations and echo results   Continue IV Zosyn for UTI based on previous sensitivities   Continue IV fluids and trend creatinine levels   Antihypertensives as needed  Resume home meds as appropriate  Labs in AM       VTE Prophylaxis: Lovenox     Dispo- likely discharge in next 24 hours to home     VITAL SIGNS: 24 HRS MIN & MAX LAST   Temp  Min: 97.7 °F (36.5 °C)  Max: 97.9 °F (36.6 °C) 97.9 °F (36.6 °C)   BP  Min: 129/93  Max: 164/115 (!) 137/91   Pulse  Min: 67  Max: 92  73   Resp  Min: 16  Max: 21 18   SpO2  Min: 96 %  Max: 98 % 96 %     I have reviewed the following labs:    Recent Labs   Lab 06/20/23  1315 06/21/23  0333   WBC 9.93 6.90   RBC 5.74 5.60   HGB 17.0 16.7   HCT 51.3 49.2   MCV 89.4 87.9   MCH 29.6 29.8   MCHC 33.1 33.9   RDW 13.5 13.5    146   MPV 13.3* 12.7*       Recent Labs   Lab 06/20/23  1315 06/20/23 2001 06/21/23  0333    139 138   K 4.6 4.1 4.1   CO2 22 27 21*   BUN 19.5 17.2 14.4   CREATININE 1.75* 1.64* 1.36*   CALCIUM 9.9 9.4 8.9   MG 2.00 2.00 1.90   ALBUMIN 4.3 3.8 3.5   ALKPHOS 125 110 111   ALT 31 25 27   AST 26 21 23   BILITOT 0.5 0.5 0.6          Microbiology Results (last 7 days)       Procedure Component Value Units Date/Time    Urine culture [[id number]]  (Abnormal) Collected: 06/20/23 1607    Order Status: Completed Specimen: Urine Updated: 06/21/23 0748     Urine Culture >/= 100,000 colonies/ml Gram-negative Rods             See below for Radiology    Scheduled Med:    amLODIPine  10 mg Oral Daily    piperacillin-tazobactam (Zosyn) IV (PEDS and ADULTS) (extended infusion is not appropriate)  4.5 g Intravenous Q8H    sofosbuvir-velpatasvir  1 tablet Oral Daily    tamsulosin  0.4 mg Oral Daily        Continuous Infusions:   lactated ringers 75 mL/hr at 06/21/23 1117        PRN Meds:  acetaminophen, aluminum-magnesium hydroxide-simethicone, melatonin, naloxone, nitroGLYCERIN, ondansetron, polyethylene glycol, prochlorperazine, simethicone       Assessment/Plan:      VTE prophylaxis:     Patient condition:  Stable/Fair/Guarded/ Serious/ Critical    Anticipated discharge and Disposition:         All diagnosis and differential diagnosis have been reviewed; assessment and plan has been documented; I have personally reviewed the labs and test results that are presently available; I have reviewed the patients medication list; I have reviewed the consulting providers response and recommendations. I have reviewed or attempted to review medical records based upon their availability    All of the patient's questions have been  addressed and answered. Patient's is agreeable to the above stated plan. I will continue to monitor closely and make adjustments to medical management as needed.  _____________________________________________________________________    Nutrition Status:    Radiology:  I have personally reviewed the following imaging and agree with the radiologist.     X-Ray Chest 1 View  Narrative: EXAMINATION:  XR CHEST 1 VIEW    CLINICAL HISTORY:  sob;    TECHNIQUE:  Frontal view(s) of the chest.    COMPARISON:  Radiography 03/14/2023    FINDINGS:  Normal cardiac silhouette.  The lungs are well-inflated.  No consolidation identified.  No significant pleural effusion or discernible pneumothorax.  Impression: No acute pulmonary process identified.    Electronically signed by: Mansoor Davis  Date:    06/20/2023  Time:    13:28      Tamika Souza MD   06/21/2023
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
## 2023-06-21 NOTE — NURSING
Nurses Note -- 4 Eyes      6/21/2023   7:42 AM      Skin assessed during: Admit      [x] No Altered Skin Integrity Present    []Prevention Measures Documented      [] Yes- Altered Skin Integrity Present or Discovered   [] LDA Added if Not in Epic (Describe Wound)   [] New Altered Skin Integrity was Present on Admit and Documented in LDA   [] Wound Image Taken    Wound Care Consulted? No    Attending Nurse:  Nicho Devine RN     Second RN/Staff Member:  Dashawn Chong RN
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
## 2023-06-21 NOTE — PLAN OF CARE
06/21/23 1132   Discharge Assessment   Assessment Type Discharge Planning Assessment   Confirmed/corrected address, phone number and insurance Yes   Confirmed Demographics Correct on Facesheet   Source of Information patient   Does patient/caregiver understand observation status Yes   Communicated SUJIT with patient/caregiver Date not available/Unable to determine   Reason For Admission R55 Syncope  R94.31 Abnormal EKG  R07.9 Chest pain  R55 Near syncope   People in Home significant other;child(shawn), dependent  (Patient lives with Significant Other: Zainab Villarreal: [phone] and (1) dependent child: 11 years of age.)   Facility Arrived From: Private residence.   Do you expect to return to your current living situation? Yes   Do you have help at home or someone to help you manage your care at home? Yes   Who are your caregiver(s) and their phone number(s)? Significant other: Zainab Villarreal: [phone].   Prior to hospitilization cognitive status: Alert/Oriented   Current cognitive status: Alert/Oriented   Home Accessibility   (Patient's home is built on a slab.)   Home Layout Able to live on 1st floor   Equipment Currently Used at Home none   Readmission within 30 days? No   Patient currently being followed by outpatient case management? No   Do you currently have service(s) that help you manage your care at home? No   Do you take prescription medications? Yes   Do you have prescription coverage? Yes   Coverage Payor:  MEDICAID - [employer] ([facility])   Do you have any problems affording any of your prescribed medications? No   Is the patient taking medications as prescribed? yes   Who is going to help you get home at discharge? Significant other: Zainab Villarreal: [phone].   How do you get to doctors appointments? car, drives self   Are you on dialysis? No   Do you take coumadin? No   Discharge Plan A Home with family   Discharge Plan B Home with family   DME Needed Upon Discharge  none    Social Connections   How often do you attend Jainism or Mormonism services? Never   Do you belong to any clubs or organizations such as Jainism groups, unions, fraternal or athletic groups, or school groups? No   How often do you attend meetings of the clubs or organizations you belong to? Never   Are you , , , , never , or living with a partner? Never marrie   Alcohol Use   Q1: How often do you have a drink containing alcohol? Never   Q2: How many drinks containing alcohol do you have on a typical day when you are drinking? None   Q3: How often do you have six or more drinks on one occasion? Never   OTHER   Name(s) of People in Home Lives with his significant other: Zainab Villarreal [phone].

## 2023-06-22 PROBLEM — R07.9 CHEST PAIN: Status: ACTIVE | Noted: 2023-01-01

## 2023-06-22 NOTE — DISCHARGE SUMMARY
[facility] Hospital Medicine Discharge Summary    Admit Date: 6/20/2023  Discharge Date and Time: 6/22/2023 12:41 PM  Admitting Physician: Papito Salomon MD  Discharging Physician: Enmanuel Dye MD.  Primary Care Physician: Jonatan Limon DO  Consults: Cardiology    Discharge Diagnoses:  Chest pain   Intravenous drug abuse   Benign prostatic hypertrophy with obstructive uropathy  ESBL E coli acute bacterial cystitis    Hospital Course:   Mr. Dow is a 42 year old male with past medical history of hypertension, hepatitis, BPH, and IVDU who presented to the Ed with c/o intermittent SOB, chest pain, and leg swelling starting 6 months ago, getting progressively worse and more frequent.  He states that today he was working outside for approximately 10 minutes picking up branches when all of a sudden he felt like he couldn't catch his breath and then had a syncopal episode. He denies any chest pain at that time.     Today's ED lab work revealed creatinine 1.64.  Original troponin was 0.046, 4 hour repeat was 0.02.  His EKG showed T-wave inversions in II, III, AVF, V1 through V4, ST depressions in V2 and V3.  UDS positive for amphetamines and fentanyl.  UA showed 4+ bacteria, 21-50 WBC, 1+ Leukocytes, and positive nitrites. CXR negative for acute pulmonary process.  ED vitals:  Temp 97.6° F, HR 86, resp 20, /115, SpO2 97% on RA.  Cardiology was consulted in the ED. He was admitted to hospital medicine for management.    Patient was evaluated by Cardiology who recommended outpatient nuclear stress test.  Serial cardiac enzymes were negative for myocardial infarction. Patient had some dysuria and urinary retention requiring Monsivais catheter placement.  Urine culture was positive for ESBL E coli.  Patient was treated with Zosyn.  Patient is afebrile, on room air, hemodynamically stable and has reached the maximum benefit of his acute care hospital stay.      Patient was seen and  examined on the day of discharge.      Vitals:  VITAL SIGNS: 24 HRS MIN & MAX LAST   Temp  Min: 97.8 °F (36.6 °C)  Max: 98.4 °F (36.9 °C) 98 °F (36.7 °C)   BP  Min: 129/87  Max: 156/91 (!) 143/98   Pulse  Min: 67  Max: 84  84   Resp  Min: 14  Max: 19 18   SpO2  Min: 94 %  Max: 99 % 99 %       Physical Exam:  General: in no acute distress  HENT: normocephalic, atraumatic  Eye: PERRL, EOMI, clear conjunctiva  Neck: full ROM, no thyromegaly, no JVD  Respiratory: clear to auscultation bilaterally  Cardiovascular: regular rate and rhythm, no murmur  Gastrointestinal: non-distended, positive bowel sounds, non-tender  Musculoskeletal: no gross deformity  Integumentary: warm, dry, intact, no rashes  Neurological: cranial nerves grossly intact, no focal neurological deficit  Psychiatric: cooperative, flat affect, depressed      Procedures Performed: No admission procedures for hospital encounter.     Significant Diagnostic Studies: See Full reports for all details    Recent Labs   Lab 06/20/23  1315 06/21/23  0333   WBC 9.93 6.90   RBC 5.74 5.60   HGB 17.0 16.7   HCT 51.3 49.2   MCV 89.4 87.9   MCH 29.6 29.8   MCHC 33.1 33.9   RDW 13.5 13.5    146   MPV 13.3* 12.7*       Recent Labs   Lab 06/20/23  1315 06/20/23 2001 06/21/23  0333 06/22/23  0447    139 138 138   K 4.6 4.1 4.1 4.4   CO2 22 27 21* 21*   BUN 19.5 17.2 14.4 13.9   CREATININE 1.75* 1.64* 1.36* 1.45*   CALCIUM 9.9 9.4 8.9 9.4   MG 2.00 2.00 1.90  --    ALBUMIN 4.3 3.8 3.5  --    ALKPHOS 125 110 111  --    ALT 31 25 27  --    AST 26 21 23  --    BILITOT 0.5 0.5 0.6  --         Microbiology Results (last 7 days)       Procedure Component Value Units Date/Time    Urine culture [[id number]]  (Abnormal)  (Susceptibility) Collected: 06/20/23 1607    Order Status: Completed Specimen: Urine Updated: 06/22/23 0808     Urine Culture >/= 100,000 colonies/ml Escherichia coli ESBL             Echo  · Concentric remodeling and normal systolic function.  · The  estimated ejection fraction is 55%.  · Grade I left ventricular diastolic dysfunction.  · Mild right ventricular enlargement with normal right ventricular   systolic function.  · There is right ventricular hypertrophy.  · Mild mitral regurgitation.  · Moderate to severe tricuspid regurgitation.  · There is severe pulmonary hypertension.  · Mild pulmonic regurgitation.  · Elevated central venous pressure (15 mmHg).  · The estimated PA systolic pressure is 97 mmHg.  · Right atrial enlargement.            Medication List        START taking these medications      nitrofurantoin 100 MG capsule  Commonly known as: MACRODANTIN  Take 1 capsule (100 mg total) by mouth every 12 (twelve) hours. for 7 days            CONTINUE taking these medications      amLODIPine 5 MG tablet  Commonly known as: NORVASC  Take 2 tablets (10 mg total) by mouth once daily.     sofosbuvir-velpatasvir 400-100 mg Tab  Commonly known as: EPCLUSA  Take 1 tablet by mouth once daily.     tamsulosin 0.4 mg Cap  Commonly known as: FLOMAX  Take 1 capsule (0.4 mg total) by mouth once daily.            STOP taking these medications      hydrOXYzine pamoate 50 MG Cap  Commonly known as: VISTARIL               Where to Get Your Medications        These medications were sent to [id number] [address] AT [facility] & [address] [address]      Hours: 24-hours Phone: [phone]   nitrofurantoin 100 MG capsule          Explained in detail to the patient about the discharge plan, medications, and follow-up visits. Pt understands and agrees with the treatment plan  Discharge Disposition: Home or Self Care   Discharged Condition: stable  Diet-   Dietary Orders (From admission, onward)       Start     Ordered    06/21/23 1103  Diet heart healthy  Diet effective now         06/21/23 1102                      Follow-up Information       [place] PA-C Follow up on 6/28/2023.     Specialty: Neurology  Why: 1:15PM No caffiene 24 hours before test. Fast 3 hours prior.  Contact information:  [address] [place] [id number]  [phone]                             Discharge time 335 minutes    For worsening symptoms, chest pain, shortness of breath, increased abdominal pain, high grade fever, stroke or stroke like symptoms, immediately go to the nearest Emergency Room or call 911 as soon as possible.      Enmanuel Sarah M.D, on 6/22/2023. at 12:41 PM.

## 2023-06-22 NOTE — PROGRESS NOTES
[facility] - [facility] Telemetry  Cardiology  Progress Note    Patient Name: Renny Dow  MRN: [id number]  Admission Date: 6/20/2023  Hospital Length of Stay: 0 days  Code Status: Full Code   Attending Physician: Enmanuel Dye MD   Primary Care Physician: Jonatan Limon DO  Expected Discharge Date: 6/22/2023  Principal Problem:Chest pain    Subjective:     Brief HPI: Mr. Dow is a 42 year old male who is not known to [facility]. He presented to [facility] on 6.20.23 after a syncopal episode earlier in the day with ongoing complaints of intermittent CP and SOB as well as BLE swelling that has been present for 6 months. Patient is known to use methamphetamines with last use date 6.19.23. On arrival to ED, labs notable for NAVA. BNP normal. Troponin 0.037--> 0.046--> 0.020 --> 0.034 (most recent). EKG noted sinus tachycardia, with T wave inversion in inferolateral leads. UDS positive for fentantly and methamphetamines. [facility] consulted for possible NSTEMI type 2 likely 2/2 stimulant use and demand ischemia. On exam, patient is comfortable and denies any current CP, SOB, or palpitations.     Hospital Course:   6.22.23: NAD. Denies CP, SOB, or palpitations.     PMH: HTN, Hepatitis C, BPH, IVDU  PSH: Right lower extremity surgery   Family History: No relevant family history  Social History: IV drug use (fentanyl and meth), denies tobacco use, admits occasional ETOH use     Previous Cardiac Diagnostics:   TTE 6.21.23  Mild concentric hypertrophy and normal systolic function.  The estimated ejection fraction is 55%.  Grade I left ventricular diastolic dysfunction.  Mild right ventricular enlargement with normal right ventricular systolic function.  There is right ventricular hypertrophy.  Mild mitral regurgitation.  Moderate tricuspid regurgitation.  There is severe pulmonary hypertension.  Mild pulmonic regurgitation.  Elevated central venous pressure (15 mmHg).  The estimated PA systolic pressure is  97 mmHg.  Right atrial enlargement.     CV US BL Carotid 6.21.23  -The right internal carotid artery demonstrated less than 50% stenosis.  -The left internal carotid artery demonstrated no hemodynamically significant stenosis.   -Bilateral vertebral arteries were patent with antegrade flow.    Arterial Duplex US 1.22.22  No significant arterial flow reduction     Venous US for DVT 1.22.22  There was no evidence of deep or superficial vein thrombosis in the right lower extremity .    Review of Systems   Constitutional: Negative for diaphoresis and malaise/fatigue.   Cardiovascular:  Negative for chest pain, dyspnea on exertion, leg swelling and palpitations.   Gastrointestinal:  Negative for diarrhea, nausea and vomiting.   Neurological:  Negative for weakness.     Objective:     Vital Signs (Most Recent):  Temp: 97.9 °F (36.6 °C) (06/22/23 0756)  Pulse: 67 (06/22/23 0756)  Resp: 14 (06/22/23 0756)  BP: (!) 138/91 (06/22/23 0858)  SpO2: 97 % (06/22/23 0756) Vital Signs (24h Range):  Temp:  [97.8 °F (36.6 °C)-98.4 °F (36.9 °C)] 97.9 °F (36.6 °C)  Pulse:  [67-79] 67  Resp:  [14-19] 14  SpO2:  [94 %-98 %] 97 %  BP: (129-156)/(87-91) 138/91     Weight: 78 kg (171 lb 15.3 oz)  Body mass index is 26.99 kg/m².    SpO2: 97 %         Intake/Output Summary (Last 24 hours) at 6/22/2023 1053  Last data filed at 6/21/2023 1450  Gross per 24 hour   Intake 120 ml   Output --   Net 120 ml       Lines/Drains/Airways       Peripheral Intravenous Line  Duration                  Peripheral IV - Single Lumen 06/20/23 2133 20 G Anterior;Proximal;Right Forearm 1 day                    Significant Labs:   Recent Results (from the past 72 hour(s))   CBC with Differential    Collection Time: 06/20/23  1:15 PM   Result Value Ref Range    WBC 9.93 4.50 - 11.50 x10(3)/mcL    RBC 5.74 4.70 - 6.10 x10(6)/mcL    Hgb 17.0 14.0 - 18.0 g/dL    Hct 51.3 42.0 - 52.0 %    MCV 89.4 80.0 - 94.0 fL    MCH 29.6 27.0 - 31.0 pg    MCHC 33.1 33.0 - 36.0 g/dL     RDW 13.5 11.5 - 17.0 %    Platelet 173 130 - 400 x10(3)/mcL    MPV 13.3 (H) 7.4 - 10.4 fL    Neut % 69.7 %    Lymph % 21.2 %    Mono % 7.7 %    Eos % 0.4 %    Basophil % 0.7 %    Lymph # 2.11 0.6 - 4.6 x10(3)/mcL    Neut # 6.92 2.1 - 9.2 x10(3)/mcL    Mono # 0.76 0.1 - 1.3 x10(3)/mcL    Eos # 0.04 0 - 0.9 x10(3)/mcL    Baso # 0.07 <=0.2 x10(3)/mcL    IG# 0.03 0 - 0.04 x10(3)/mcL    IG% 0.3 %    NRBC% 0.0 %   Comprehensive Metabolic Panel    Collection Time: 06/20/23  1:15 PM   Result Value Ref Range    Sodium Level 140 136 - 145 mmol/L    Potassium Level 4.6 3.5 - 5.1 mmol/L    Chloride 107 98 - 107 mmol/L    Carbon Dioxide 22 22 - 29 mmol/L    Glucose Level 80 74 - 100 mg/dL    Blood Urea Nitrogen 19.5 8.9 - 20.6 mg/dL    Creatinine 1.75 (H) 0.73 - 1.18 mg/dL    Calcium Level Total 9.9 8.4 - 10.2 mg/dL    Protein Total 7.8 6.4 - 8.3 gm/dL    Albumin Level 4.3 3.5 - 5.0 g/dL    Globulin 3.5 2.4 - 3.5 gm/dL    Albumin/Globulin Ratio 1.2 1.1 - 2.0 ratio    Bilirubin Total 0.5 <=1.5 mg/dL    Alkaline Phosphatase 125 40 - 150 unit/L    Alanine Aminotransferase 31 0 - 55 unit/L    Aspartate Aminotransferase 26 5 - 34 unit/L    eGFR 50 mls/min/1.73/m2   Magnesium    Collection Time: 06/20/23  1:15 PM   Result Value Ref Range    Magnesium Level 2.00 1.60 - 2.60 mg/dL   Troponin I    Collection Time: 06/20/23  1:15 PM   Result Value Ref Range    Troponin-I 0.037 0.000 - 0.045 ng/mL   BNP    Collection Time: 06/20/23  1:15 PM   Result Value Ref Range    Natriuretic Peptide 76.6 <=100.0 pg/mL   Urinalysis, Reflex to Urine Culture    Collection Time: 06/20/23  4:07 PM    Specimen: Urine   Result Value Ref Range    Color, UA Yellow Yellow, Light-Yellow, Dark Yellow, Meghna, Straw    Appearance, UA Cloudy (A) Clear    Specific Gravity, UA 1.022 1.005 - 1.030    pH, UA 5.5 5.0 - 8.5    Protein, UA Trace (A) Negative mg/dL    Glucose, UA Negative Negative, Normal mg/dL    Ketones, UA Negative Negative mg/dL    Blood, UA Negative  Negative unit/L    Bilirubin, UA Negative Negative mg/dL    Urobilinogen, UA 1.0 0.2, 1.0, Normal mg/dL    Nitrites, UA Positive (A) Negative    Leukocyte Esterase, UA 1+ (A) Negative unit/L   Drug Screen, Urine    Collection Time: 06/20/23  4:07 PM   Result Value Ref Range    Amphetamines, Urine Positive (A) Negative    Barbituates, Urine Negative Negative    Benzodiazepine, Urine Negative Negative    Cannabinoids, Urine Negative Negative    Cocaine, Urine Negative Negative    Fentanyl, Urine Positive (A) Negative    MDMA, Urine Negative Negative    Opiates, Urine Negative Negative    Phencyclidine, Urine Negative Negative    pH, Urine 5.5 3.0 - 11.0    Specific Gravity, Urine Auto 1.022 1.001 - 1.035   Urinalysis, Microscopic    Collection Time: 06/20/23  4:07 PM   Result Value Ref Range    RBC, UA 0-2 None Seen, 0-2, 3-5, 0-5 /HPF    WBC, UA 21-50 (A) None Seen, 0-2, 3-5, 0-5 /HPF    Squamous Epithelial Cells, UA 0-4 0-4, None Seen /HPF    Bacteria, UA 4+ (A) None Seen, Rare, Occasional /HPF   Urine culture    Collection Time: 06/20/23  4:07 PM    Specimen: Urine   Result Value Ref Range    Urine Culture >/= 100,000 colonies/ml Escherichia coli ESBL (A)        Susceptibility    Escherichia coli ESBL -  (no method available)     Amox/K Clav 8 Sensitive      Ampicillin >=32 Resistant      Cefazolin >=64 Resistant      Cefepime 16 Resistant      Ceftriaxone >=64 Resistant      Cefuroxime >=64 Resistant      Ciprofloxacin 1 Resistant      Gentamicin <=1 Sensitive      Levofloxacin 1 Intermediate      Meropenem <=0.25 Sensitive      Nitrofurantoin <=16 Sensitive      Piperacillin/Tazobactam <=4 Sensitive      Trimethoprim/Sulfamethoxazole >=320 Resistant      Tetracycline 2 Sensitive      Tobramycin <=1 Sensitive    C-reactive protein    Collection Time: 06/20/23  4:41 PM   Result Value Ref Range    C-Reactive Protein <1.00 <5.00 mg/L   Sedimentation Rate    Collection Time: 06/20/23  4:41 PM   Result Value Ref Range     Sed Rate 11 0 - 15 mm/hr   Troponin I    Collection Time: 06/20/23  4:41 PM   Result Value Ref Range    Troponin-I 0.046 (H) 0.000 - 0.045 ng/mL   Comprehensive metabolic panel    Collection Time: 06/20/23  8:01 PM   Result Value Ref Range    Sodium Level 139 136 - 145 mmol/L    Potassium Level 4.1 3.5 - 5.1 mmol/L    Chloride 106 98 - 107 mmol/L    Carbon Dioxide 27 22 - 29 mmol/L    Glucose Level 86 74 - 100 mg/dL    Blood Urea Nitrogen 17.2 8.9 - 20.6 mg/dL    Creatinine 1.64 (H) 0.73 - 1.18 mg/dL    Calcium Level Total 9.4 8.4 - 10.2 mg/dL    Protein Total 6.8 6.4 - 8.3 gm/dL    Albumin Level 3.8 3.5 - 5.0 g/dL    Globulin 3.0 2.4 - 3.5 gm/dL    Albumin/Globulin Ratio 1.3 1.1 - 2.0 ratio    Bilirubin Total 0.5 <=1.5 mg/dL    Alkaline Phosphatase 110 40 - 150 unit/L    Alanine Aminotransferase 25 0 - 55 unit/L    Aspartate Aminotransferase 21 5 - 34 unit/L    eGFR 54 mls/min/1.73/m2   Troponin I    Collection Time: 06/20/23  8:01 PM   Result Value Ref Range    Troponin-I 0.020 0.000 - 0.045 ng/mL   Magnesium    Collection Time: 06/20/23  8:01 PM   Result Value Ref Range    Magnesium Level 2.00 1.60 - 2.60 mg/dL   D dimer, quantitative    Collection Time: 06/20/23  8:01 PM   Result Value Ref Range    D-Dimer 0.28 0.00 - 0.50 ug/mL FEU   Troponin I    Collection Time: 06/21/23  1:46 AM   Result Value Ref Range    Troponin-I 0.034 0.000 - 0.045 ng/mL   Comprehensive Metabolic Panel (CMP)    Collection Time: 06/21/23  3:33 AM   Result Value Ref Range    Sodium Level 138 136 - 145 mmol/L    Potassium Level 4.1 3.5 - 5.1 mmol/L    Chloride 108 (H) 98 - 107 mmol/L    Carbon Dioxide 21 (L) 22 - 29 mmol/L    Glucose Level 104 (H) 74 - 100 mg/dL    Blood Urea Nitrogen 14.4 8.9 - 20.6 mg/dL    Creatinine 1.36 (H) 0.73 - 1.18 mg/dL    Calcium Level Total 8.9 8.4 - 10.2 mg/dL    Protein Total 6.8 6.4 - 8.3 gm/dL    Albumin Level 3.5 3.5 - 5.0 g/dL    Globulin 3.3 2.4 - 3.5 gm/dL    Albumin/Globulin Ratio 1.1 1.1 - 2.0  ratio    Bilirubin Total 0.6 <=1.5 mg/dL    Alkaline Phosphatase 111 40 - 150 unit/L    Alanine Aminotransferase 27 0 - 55 unit/L    Aspartate Aminotransferase 23 5 - 34 unit/L    eGFR >60 mls/min/1.73/m2   Magnesium    Collection Time: 06/21/23  3:33 AM   Result Value Ref Range    Magnesium Level 1.90 1.60 - 2.60 mg/dL   Phosphorus    Collection Time: 06/21/23  3:33 AM   Result Value Ref Range    Phosphorus Level 3.8 2.3 - 4.7 mg/dL   CBC with Differential    Collection Time: 06/21/23  3:33 AM   Result Value Ref Range    WBC 6.90 4.50 - 11.50 x10(3)/mcL    RBC 5.60 4.70 - 6.10 x10(6)/mcL    Hgb 16.7 14.0 - 18.0 g/dL    Hct 49.2 42.0 - 52.0 %    MCV 87.9 80.0 - 94.0 fL    MCH 29.8 27.0 - 31.0 pg    MCHC 33.9 33.0 - 36.0 g/dL    RDW 13.5 11.5 - 17.0 %    Platelet 146 130 - 400 x10(3)/mcL    MPV 12.7 (H) 7.4 - 10.4 fL    Neut % 43.7 %    Lymph % 44.5 %    Mono % 9.3 %    Eos % 1.6 %    Basophil % 0.6 %    Lymph # 3.07 0.6 - 4.6 x10(3)/mcL    Neut # 3.02 2.1 - 9.2 x10(3)/mcL    Mono # 0.64 0.1 - 1.3 x10(3)/mcL    Eos # 0.11 0 - 0.9 x10(3)/mcL    Baso # 0.04 <=0.2 x10(3)/mcL    IG# 0.02 0 - 0.04 x10(3)/mcL    IG% 0.3 %    NRBC% 0.0 %   CV Ultrasound Bilateral Doppler Carotid    Collection Time: 06/21/23  8:41 AM   Result Value Ref Range    Left ICA/CCA ratio 0.77     Right ICA/CCA ratio 0.38     Left Highest ICA 50.00     Left Highest CCA 84     Right Highest ICA 36.00     Right Highest CCA 94     Right Highest EDV 9.00     LT Highest EDV 12.00     Right CCA prox sys 66 cm/s    Right CCA prox hull 10 cm/s    Right CCA dist sys 94 cm/s    Right CCA dist hull 0 cm/s    Right ICA prox sys 36 cm/s    Right ICA prox hull 9 cm/s    Right ICA mid sys 24 cm/s    Right ICA mid hull 8 cm/s    Right ICA dist sys 23 cm/s    Right ICA dist hull 9 cm/s    Right ECA sys 69 cm/s    Right vertebral sys 23 cm/s    Left CCA prox sys 84 cm/s    Left CCA prox hull 16 cm/s    Left CCA dist sys 65 cm/s    Left CCA dist hull 14 cm/s    Left  ICA prox sys 50 cm/s    Left ICA prox hull 11 cm/s    Left ICA mid sys 35 cm/s    Left ICA mid hull 9 cm/s    Left ICA dist sys 29 cm/s    Left ICA dist hull 12 cm/s    Left ECA sys 52 cm/s    Left vertebral sys 31 cm/s    Right vertebral hull 0 cm/s    Right ECA hull 2 cm/s    Left vertebral hull 8 cm/s    Left ECA hull 4 cm/s   Troponin I    Collection Time: 06/21/23  9:24 AM   Result Value Ref Range    Troponin-I <0.010 0.000 - 0.045 ng/mL   Echo    Collection Time: 06/21/23 11:09 AM   Result Value Ref Range    BSA 1.92 m2    TDI SEPTAL 0.04 m/s    LV LATERAL E/E' RATIO 4.50 m/s    LV SEPTAL E/E' RATIO 9.00 m/s    Right Atrial Pressure (from IVC) 15 mmHg    RV mid diameter 4.03 cm    EF 55 %    Left Ventricular Outflow Tract Mean Velocity 0.51 cm/s    Left Ventricular Outflow Tract Mean Gradient 1.00 mmHg    TDI LATERAL 0.08 m/s    LVIDd 4.43 3.5 - 6.0 cm    IVS 1.25 (A) 0.6 - 1.1 cm    Posterior Wall 1.23 (A) 0.6 - 1.1 cm    LVIDs 2.91 2.1 - 4.0 cm    FS 34 28 - 44 %    LV mass 202.78 g    LA size 3.20 cm    RVDD 3.92 cm    TAPSE 1.97 cm    Left Ventricle Relative Wall Thickness 0.56 cm    AV mean gradient 2 mmHg    AV valve area 3.65 cm2    AV Velocity Ratio 0.89     AV index (prosthetic) 0.96     MV mean gradient 1 mmHg    MV valve area by continuity eq 3.83 cm2    E/A ratio 0.53     Mean e' 0.06 m/s    E wave deceleration time 238.00 msec    LVOT diameter 2.20 cm    LVOT area 3.8 cm2    LVOT peak jerson 0.82 m/s    LVOT peak VTI 14.50 cm    Ao peak jerson 0.92 m/s    Ao VTI 15.1 cm    LVOT stroke volume 55.09 cm3    AV peak gradient 3 mmHg    MV peak gradient 3 mmHg    TV rest pulmonary artery pressure 97 mmHg    E/E' ratio 6.00 m/s    MV Peak E Jerson 0.36 m/s    TR Max Jerson 4.53 m/s    MV VTI 14.4 cm    MV Peak A Jerson 0.68 m/s    LV Systolic Volume 32.50 mL    LV Systolic Volume Index 17.2 mL/m2    LV Diastolic Volume 89.10 mL    LV Diastolic Volume Index 47.14 mL/m2    LV Mass Index 107 g/m2    RA Major Axis 4.72 cm     Triscuspid Valve Regurgitation Peak Gradient 82 mmHg    LA Volume Index (Mod) 21.5 mL/m2    LA volume (mod) 40.60 cm3    RA Width 4.29 cm    Zepeda's Biplane MOD Ejection Fraction 5 %   Basic Metabolic Panel    Collection Time: 06/22/23  4:47 AM   Result Value Ref Range    Sodium Level 138 136 - 145 mmol/L    Potassium Level 4.4 3.5 - 5.1 mmol/L    Chloride 109 (H) 98 - 107 mmol/L    Carbon Dioxide 21 (L) 22 - 29 mmol/L    Glucose Level 85 74 - 100 mg/dL    Blood Urea Nitrogen 13.9 8.9 - 20.6 mg/dL    Creatinine 1.45 (H) 0.73 - 1.18 mg/dL    BUN/Creatinine Ratio 10     Calcium Level Total 9.4 8.4 - 10.2 mg/dL    Anion Gap 8.0 mEq/L    eGFR >60 mls/min/1.73/m2       Telemetry:  Sinus rhythm, HR 65 BPM    Physical Exam  Vitals reviewed.   Constitutional:       General: He is not in acute distress.     Appearance: Normal appearance. He is normal weight. He is not ill-appearing.   HENT:      Head: Normocephalic.      Mouth/Throat:      Mouth: Mucous membranes are moist.   Eyes:      Extraocular Movements: Extraocular movements intact.   Cardiovascular:      Rate and Rhythm: Normal rate and regular rhythm.      Pulses: Normal pulses.   Pulmonary:      Effort: Pulmonary effort is normal.   Abdominal:      Palpations: Abdomen is soft.   Musculoskeletal:      Cervical back: Normal range of motion.      Right lower leg: No edema.      Left lower leg: No edema.   Skin:     General: Skin is warm.   Neurological:      General: No focal deficit present.      Mental Status: He is alert and oriented to person, place, and time.   Psychiatric:         Mood and Affect: Mood normal.         Behavior: Behavior normal.       Current Inpatient Medications:    Current Facility-Administered Medications:     acetaminophen tablet 650 mg, 650 mg, Oral, Q6H PRN, Rocio Rowe, AGACNP-BC, 650 mg at 06/21/23 0357    aluminum-magnesium hydroxide-simethicone 200-200-20 mg/5 mL suspension 30 mL, 30 mL, Oral, QID PRN, Rocio Rowe,  AGACNP-BC    amLODIPine tablet 10 mg, 10 mg, Oral, Daily, Rocio G Jae AGACNP-BC, 10 mg at 06/22/23 0858    lactated ringers infusion, , Intravenous, Continuous, Rocio CHESTER Jae AGACNP-BC, Last Rate: 75 mL/hr at 06/21/23 1117, New Bag at 06/21/23 1117    melatonin tablet 6 mg, 6 mg, Oral, Nightly PRN, Rocio Rowe AGACNP-BC    naloxone 0.4 mg/mL injection 0.02 mg, 0.02 mg, Intravenous, PRN, Rocio Rowe, AGACNP-BC    nitroGLYCERIN SL tablet 0.4 mg, 0.4 mg, Sublingual, Q5 Min PRN, Tamika Souza MD    ondansetron injection 4 mg, 4 mg, Intravenous, Q4H PRN, Rocio Rowe, AGACNP-BC    piperacillin-tazobactam (ZOSYN) 4.5 g in dextrose 5 % in water (D5W) 5 % 100 mL IVPB (MB+), 4.5 g, Intravenous, Q8H, Rocio CHESTER Jae AGACNP-BC, Last Rate: 25 mL/hr at 06/22/23 0520, 4.5 g at 06/22/23 0520    polyethylene glycol packet 17 g, 17 g, Oral, BID PRN, Rocio Rowe, AGACNP-BC    prochlorperazine injection Soln 5 mg, 5 mg, Intravenous, Q6H PRN, Rocio Rowe, AGACNP-BC    simethicone chewable tablet 80 mg, 1 tablet, Oral, QID PRN, Rocio Rowe, AGACNP-BC    sofosbuvir-velpatasvir 400-100 mg Tab 1 tablet, 1 tablet, Oral, Daily, Rocio Rowe, AGACNP-BC, 1 tablet at 06/22/23 0858    tamsulosin 24 hr capsule 0.4 mg, 0.4 mg, Oral, Daily, Rocio Rowe, AGACNP-BC, 0.4 mg at 06/22/23 0858    VTE Risk Mitigation (From admission, onward)           Ordered     IP VTE LOW RISK PATIENT  Once         06/20/23 2335     Place sequential compression device  Until discontinued         06/20/23 2335                    Assessment:   Chest pain/Elevated Troponin- resolved     - 0.037-->0.046-->0.020-->0.034--> <0.010     - EKG sinus rhythm with T-wave inversion inferolateral leads     - Echo with EF 55%, Grade I LV diastolic dysfunction, mild MR, mod TR, severe pulmonary hypertension, mild OH     - Resolved at this time     - Likely due to drug use  SOB- Resolved   Presyncope  HTN- uncontrolled  Hepatitis C  Elevated  BMI  IV Drug Use  Pulmonary Hypertension  NAVA/UTI      Plan:   Troponin levels within normal limits, patient stable  Outpatient referral for nuclear stress test. No further cardic diagnostics/procedures needed in inpatient setting at this time.  Nuclear stress test scheduled for 6/28/23 at [facility]  Continue home medications.  Counseled on importance of stopping IV drug use.  Agree with antibiotic management for UTI.  Management of NAVA per primary.  Labs in AM.    Cardiology will sign off at this time. Please re-consult for any further questions or concerns regarding patient.       [place] PA-C  Cardiology  [facility] - [facility] Tele[facility]  06/22/2023     I have seen the patient, reviewed the Nurse Practitioner's note, assessment and plan. I have personally interviewed and examined the patient at bedside and agree with the findings. Medical decision making listed above were done under my guidance.    Physical exam:  Cardiovascular system: regular rhythm, no murmur.  Lungs: CTAB.  Extremities: No leg edema.    Plan:  Outpt nuc

## 2023-06-27 NOTE — PROGRESS NOTES
Phoned patient this morning and this afternoon to remind of telehealth virtual visit. Left message x 2.

## 2023-07-20 NOTE — Clinical Note
dry, intact, no bleeding and no hematoma. 7F RFV and 5F RFA sheaths left in place, secured with tegaderm.

## 2023-07-20 NOTE — Clinical Note
The PA catheter was inserted into the right atrium. [place] advanced for RHC, O2 saturations obtained, hemo monitored. Catheter removed

## 2023-07-20 NOTE — Clinical Note
The catheter was inserted into the and was inserted over the wire into the left ventricle. Hemodynamics were performed.  and Pullback was recorded.  Pigtail. Catheter removed

## 2023-07-20 NOTE — Clinical Note
The catheter was inserted into the and was inserted over the wire into the ostium   left main. Hemodynamics were performed.  An angiography was performed of the left coronary arteries. Multiple views were taken. The angiography was performed via power injection.  JL4. Catheter removed

## 2023-07-20 NOTE — OP NOTE
[facility] - Cath Lab Services  Brief Operative Note    SUMMARY     Surgery Date: 7/20/2023     Surgeon(s) and Role:     * Ulises Collins Jr., MD - Primary    Assisting Surgeon: None    Pre-op Diagnosis:  Dyspnea on exertion [R06.09]  Syncope and collapse [R55]    Post-op Diagnosis:  Post-Op Diagnosis Codes:     * Dyspnea on exertion [R06.09]     * Syncope and collapse [R55]    Procedure(s) (LRB):  CATHETERIZATION, HEART, BOTH LEFT AND RIGHT (N/A)    Anesthesia: RN IV Sedation    Operative Findings: pHTN, no obstructive CAD    Estimated Blood Loss: * No values recorded between 7/20/2023  8:44 AM and 7/20/2023  9:35 AM *    Estimated Blood Loss has been documented.         Specimens:   Specimen (24h ago, onward)      None            [id number]

## 2023-07-20 NOTE — Clinical Note
The catheter was inserted into the and was inserted over the wire into the ostium   right coronary artery. Hemodynamics were performed.  An angiography was performed of the right coronary arteries. Multiple views were taken. The angiography was performed via power injection.  JR4. Catheter removed

## 2023-07-22 NOTE — DISCHARGE SUMMARY
[facility] - Cath Lab Services  Discharge Note  Short Stay     Procedure(s) (LRB): left and right heart catheterization        OUTCOME: Patient tolerated treatment/procedure well without complication and is now ready for discharge.     DISPOSITION: Home or Self Care     FINAL DIAGNOSIS:  pulmonary hypertension, non obstructive CAD     FOLLOWUP: In clinic     DISCHARGE INSTRUCTIONS:  No discharge procedures on file.      TIME SPENT ON DISCHARGE: 30 minutes

## 2023-07-25 NOTE — PROGRESS NOTES
Subjective     Patient ID: Renny Dow is a 43 y.o. male.    Chief Complaint: Followup Hep C (States was in the hospital for a couple of days, regarding heart problems, did miss a couple of meds)    Patient and provider are located in the state of [place].     Face to Face time with patient:  30 minutes of total time spent on the encounter, which includes face to face time and non-face to face time preparing to see the patient (eg, review of tests), Obtaining and/or reviewing separately obtained history, Documenting clinical information in the electronic or other health record, Independently interpreting results (not separately reported) and communicating results to the patient/family/caregiver, or Care coordination (not separately reported).      Each patient to whom he or she provides medical services by telemedicine is:  (1) informed of the relationship between the physician and patient and the respective role of any other health care provider with respect to management of the patient; and (2) notified that he or she may decline to receive medical services by telemedicine and may withdraw from such care at any time.  7/25/23  Renny is a 42 yo WM evaluated today via audio-video virtual visit for HCV f/u visit.  He started Epclusa on 4/20/23 and still has a few tablets left to complete the 84 tabs prescribed. He was admitted to hospital & underwent angiogram for chest pain, shortness of breath, and syncopal episode. He has been diagnosed with pulmonary hypertension & non-obstructive CAD.  He has cardiology f/u visit today. Labs collected 7/24/23 HCV RNA pending, will call pt with results. He tells me that he continues to mckenzie with meth IVDU, desires to quit but is having trouble doing so.  Strongly encouraged seeking medical assistance to do so, particularly given cardiac complications of same that will continue to worsen if use continues.  Voiced understanding & agrees to do so.  Will plan for  repeat labs in 3 months & in office visit for 2nd dose of Hep A vaccine as well.  Extreme blood precautions to minimize risk of Hep C re-exposure given ongoing IVDU. Voiced understanding. All questions answered & concerns addressed.       5/23/23  Renny is a 41 yo WM evaluated today via audio-video virtual visit for HCV f/u. He started Epclusa 12 week treatment course on 4/20/23 & is taking it daily.  He has not noted any side effects.  He did increase water intake, but is still not at goal.  He has not yet entered rehab as discussed, still under consideration. FibroScan completed 4/19/23, resulted S0, F0-1. He has not yet come in for week 4 labs, agrees to come today for same.  All questions answered & concerns addressed.     4/19/23  Renny is a 41 yo WM referred to [facility] by PCP Dr. Limon 3/23 for HCV infection, diagnosed 5/2021.  He is treatment naive.  He tells me that he is still actively using meth, but is ready to quit and has plans to go into rehab.  He has been to rehab in the past, stayed clean for a couple of years then relapsed.  He last did IVDU 1 week ago.  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 No history of blood transfusion or known HCV + sexual contacts. He is sexually active, currently with only one partner.  MSF & does not use condoms. Does not drink alcohol.  DOC methamphetamine, last use 4/18/23.  He also endorses recently starting to inject steroids.  Advised to discontinue, agrees to do so. He is amenable to Hep A vaccination.  Does not drink water, agrees to start doing after looking at lab results. He is eager to start treatment & agrees to adhere to recommended treatment protocol.  Appreciates opportunity for virtual visits, especially given his plan to enter rehab treatment program.  All questions answered & concerns addressed.    Review of Systems   Constitutional: Negative.    HENT: Negative.     Respiratory: Negative.     Cardiovascular: Negative.    Gastrointestinal:  Negative.    Genitourinary: Negative.    Integumentary:  Negative.   Neurological: Negative.    Hematological: Negative.    Psychiatric/Behavioral: Negative.          Objective     Physical Exam  Constitutional:       General: He is not in acute distress.     Appearance: Normal appearance.   HENT:      Head: Normocephalic.   Eyes:      Conjunctiva/sclera: Conjunctivae normal.   Pulmonary:      Effort: Pulmonary effort is normal.   Musculoskeletal:         General: Normal range of motion.      Cervical back: Normal range of motion.   Neurological:      General: No focal deficit present.      Mental Status: He is alert and oriented to person, place, and time. Mental status is at baseline.   Psychiatric:         Mood and Affect: Mood normal.         Behavior: Behavior normal.         Thought Content: Thought content normal.         Judgment: Judgment normal.          Assessment and Plan     1. Chronic hepatitis C without hepatic coma  -     Hepatitis C Virus Quantitative; Future; Expected date: 10/30/2023  Diagnosed 2021.  Treatment naive.  GT 1a, baseline VL 4.56 mil.  Fibrosure: 4/12/23 A3, F1.  FibroScan 4/19/23 S0, F0-1.  CT abd 3/30/23: Liver WNL.  Extreme blood precautions: do not share a razor, needle, toothbrush, clippers with anyone. Ensure all personal paraphernalia is replaced.  Continue Epclusa 1 po daily x 12 weeks started 4/20/23, will complete in 3-4 days.   Week 4 labs 5/23/23 HCV detected <15.  Week 12 labs 7/24/23 HCV RNA pending.   Will call pt with results.   SVR 12 labs in 3 months.   Increase water intake.  Stop use of all illicit drugs, seek assistance for same.   RTC 3 months with Christina, in clinic.     2. Need for vaccination  Hep A #2 next visit.

## 2023-07-25 NOTE — PROGRESS NOTES
Phoned patient yesterday and today to remind of virtual visit. No answer. Message left on voice mail to contact clinic.

## 2023-07-28 NOTE — TELEPHONE ENCOUNTER
Patient contacted the clinic. Informed of Negative HCV results. Voiced happy appreciation for call. Reminded to keep followup appt. Verbalized understanding.

## 2023-10-05 NOTE — TELEPHONE ENCOUNTER
Contacted to rescheduled missed appt for PH consult. No answer received.  VM left with contact info.  Letter mailed and portal message sent. Teams notified.

## 2023-10-17 NOTE — TELEPHONE ENCOUNTER
Spoke with patient friend Zainab and she stated patient phone is broken but would give him the message to call and reschedule appointment

## 2023-10-25 NOTE — TELEPHONE ENCOUNTER
Pulmonary Hypertension Nurse Navigator contacted Renny Dow's SO number ([phone]) as result of message.  SO states that his phone is broken.  Scheduled appointment for 11/28/2023 with walk and labs.  ECHO obtained in September in [place].

## 2023-11-09 PROBLEM — I46.9 CARDIAC ARREST: Status: ACTIVE | Noted: 2023-01-01

## 2023-11-09 PROBLEM — S00.91XA ABRASION OF HEAD: Status: ACTIVE | Noted: 2023-01-01

## 2023-11-10 LAB
ANION GAP SERPL CALC-SCNC: 20 MMOL/L (ref 8–16)
BUN SERPL-MCNC: 29 MG/DL (ref 6–30)
CHLORIDE SERPL-SCNC: 105 MMOL/L (ref 95–110)
CREAT SERPL-MCNC: 1.5 MG/DL (ref 0.5–1.4)
GLUCOSE SERPL-MCNC: 255 MG/DL (ref 70–110)
HCT VFR BLD CALC: 44 %PCV (ref 36–54)
HGB BLD-MCNC: 15 G/DL
POC IONIZED CALCIUM: 1.27 MMOL/L (ref 1.06–1.42)
POC TCO2 (MEASURED): 22 MMOL/L (ref 23–29)
POTASSIUM BLD-SCNC: 4.8 MMOL/L (ref 3.5–5.1)
SAMPLE: ABNORMAL
SODIUM BLD-SCNC: 141 MMOL/L (ref 136–145)

## 2023-11-10 NOTE — SUBJECTIVE & OBJECTIVE
No current facility-administered medications on file prior to encounter.     No current outpatient medications on file prior to encounter.       Review of patient's allergies indicates:  Not on File    No past medical history on file.  No past surgical history on file.  Family History    None       Tobacco Use    Smoking status: Not on file    Smokeless tobacco: Not on file   Substance and Sexual Activity    Alcohol use: Not on file    Drug use: Not on file    Sexual activity: Not on file     Review of Systems   Reason unable to perform ROS: Cardiac arrest.     Objective:     Vital Signs (Most Recent):  Temp: (S) (!) 32 °F (0 °C) (CODE) (11/09/23 2202)  Pulse:  (0- CODE) (11/09/23 2202)  Resp: (S) 12 (11/09/23 2202)  BP: (S) (!) 0/0 (CODE) (11/09/23 2202) Vital Signs (24h Range):  Temp:  [32 °F (0 °C)] 32 °F (0 °C)  Resp:  [12] 12  BP: (0)/(0) 0/0     Weight: 81.6 kg (180 lb)  Body mass index is 28.19 kg/m².     Physical Exam  Constitutional:       General: He is in acute distress.      Appearance: He is ill-appearing, toxic-appearing and diaphoretic.   HENT:      Head:      Comments: Abrasions  Eyes:      Comments: Pupils fixed and dilated   Cardiovascular:      Comments: Pulseless and asystolic  Pulmonary:      Comments: No spontaneous effort.  Abdominal:      General: Abdomen is flat.      Palpations: Abdomen is soft.   Musculoskeletal:         General: No swelling or deformity.   Neurological:      Comments: GCS 3 throughout            I have reviewed all pertinent lab results within the past 24 hours.  No labs other than CBG obtained    Significant Diagnostics:  No imaging studies obtained

## 2023-11-10 NOTE — CONSULTS
[facility] - Emergency Dept  Trauma Surgery  Consult Note    Patient Name: Renny Dow  MRN: [id number]  Code Status: No Order  Admission Date: 11/9/2023  Hospital Length of Stay: 0 days  Attending Physician: Jj Julian MD  Primary Care Provider: Jonatan Limon DO    Patient information was obtained from EMS personnel and ER records.     Consults  Subjective:     Principal Problem: Cardiac arrest    History of Present Illness: 43-year-old male presents as a level 1 trauma activation.  The history is not completely clear.  He was at a storage unit unloading a truck.  The person that the patient was with went into the storage unit and when he came out the patient was on the ground by EMS report.  Unknown downtime.  EMS arrived and the patient was in asystole.  They did perform ACLS and got VFib and shocked the patient.  He received 5 rounds of ACLS.  Upon arrival here the patient was asystolic.  The patient received full ACLS efforts.  Only external signs of trauma was abrasions of the forehead.  EMS reports that the person with the patient reported a past history cardiac disease of unknown specificity.  There was drug paraphernalia in the patient's pants upon arrival.  The patient did receive Narcan in the field.  A CBG was checked in the field and again in the hospital.  The patient remained asystolic other than a brief episode of PEA. Time of death was called at 10:13 p.m..      No current facility-administered medications on file prior to encounter.     No current outpatient medications on file prior to encounter.       Review of patient's allergies indicates:  Not on File    No past medical history on file.  No past surgical history on file.  Family History    None       Tobacco Use    Smoking status: Not on file    Smokeless tobacco: Not on file   Substance and Sexual Activity    Alcohol use: Not on file    Drug use: Not on file    Sexual activity: Not on file     Review of  Systems   Reason unable to perform ROS: Cardiac arrest.     Objective:     Vital Signs (Most Recent):  Temp: (S) (!) 32 °F (0 °C) (CODE) (11/09/23 2202)  Pulse:  (0- CODE) (11/09/23 2202)  Resp: (S) 12 (11/09/23 2202)  BP: (S) (!) 0/0 (CODE) (11/09/23 2202) Vital Signs (24h Range):  Temp:  [32 °F (0 °C)] 32 °F (0 °C)  Resp:  [12] 12  BP: (0)/(0) 0/0     Weight: 81.6 kg (180 lb)  Body mass index is 28.19 kg/m².     Physical Exam  Constitutional:       General: He is in acute distress.      Appearance: He is ill-appearing, toxic-appearing and diaphoretic.   HENT:      Head:      Comments: Abrasions  Eyes:      Comments: Pupils fixed and dilated   Cardiovascular:      Comments: Pulseless and asystolic  Pulmonary:      Comments: No spontaneous effort.  Abdominal:      General: Abdomen is flat.      Palpations: Abdomen is soft.   Musculoskeletal:         General: No swelling or deformity.   Neurological:      Comments: GCS 3 throughout            I have reviewed all pertinent lab results within the past 24 hours.  No labs other than CBG obtained    Significant Diagnostics:  No imaging studies obtained    Assessment/Plan:     Abrasion of head  Cardiac arrest.  Time of death 10:13 p.m..  See above.      VTE Risk Mitigation (From admission, onward)    None          Krystian Ellsworth, TREE  Trauma Surgery  [facility] - Emergency Dept

## 2023-11-10 NOTE — HPI
43-year-old male presents as a level 1 trauma activation.  The history is not completely clear.  He was at a storage unit unloading a truck.  The person that the patient was with went into the storage unit and when he came out the patient was on the ground by EMS report.  Unknown downtime.  EMS arrived and the patient was in asystole.  They did perform ACLS and got VFib and shocked the patient.  He received 5 rounds of ACLS.  Upon arrival here the patient was asystolic.  The patient received full ACLS efforts.  Only external signs of trauma was abrasions of the forehead.  EMS reports that the person with the patient reported a past history cardiac disease of unknown specificity.  There was drug paraphernalia in the patient's pants upon arrival.  The patient did receive Narcan in the field.  A CBG was checked in the field and again in the hospital.  The patient remained asystolic other than a brief episode of PEA. Time of death was called at 10:13 p.m..

## 2023-11-10 NOTE — ED PROVIDER NOTES
"Encounter Date: 11/9/2023    SCRIBE #1 NOTE: I, Eve Christina, am scribing for, and in the presence of,  Jj Julian MD. I have scribed the following portions of the note - Other sections scribed: HPI, ROS, PE.       History   No chief complaint on file.    43 year old male presents to the ED via EMS for cardiac arrest. EMS reports that pt was loading things into a storage unit with his friend when his friend found him outside of the unit unresponsive. The friend did not know how long pt was down and EMS notes that the friend said the pt has "heart problems." Drugs were found on scene but EMS notes that he is not sure which substance they were. Pt received 4 of Narcan and 4 epi's by EMS. Pt's initial rhythm was asystole and CPR was being given to pt on arrival. EMS notes that CPR began approximately 30 minutes ago. In the ED, pt received 3 epi's, 1 dose of bicarb, 2 doses of atropine. History from pt unobtainable due to unresponsiveness.     The history is provided by the EMS personnel. The history is limited by the condition of the patient. No  was used.     Review of patient's allergies indicates:  Not on File  No past medical history on file.  No past surgical history on file.  No family history on file.     Review of Systems   Unable to perform ROS: Patient unresponsive       Physical Exam     Initial Vitals [11/09/23 2202]   BP Pulse Resp Temp SpO2   (S) (!) 0/0 -- (S) 12 (S) (!) 32 °F (0 °C) --      MAP       --         Physical Exam    Nursing note and vitals reviewed.  Constitutional: He appears well-developed.   HENT:   Head: Normocephalic and atraumatic.   Mouth/Throat: Oropharynx is clear and moist.   Eyes: Conjunctivae are normal.   Pupils fixed and dilated    Neck: Neck supple.   C-collar    Cardiovascular:            No murmur heard.  Pulmonary/Chest: Breath sounds normal. No respiratory distress.   Abdominal: Abdomen is soft. Bowel sounds are normal. He exhibits no " distension.   Musculoskeletal:      Cervical back: Neck supple.      Lumbar back: Normal. No tenderness. Normal range of motion.     Skin: Skin is warm and dry.   Cyanotic          ED Course   Critical Care    Date/Time: 11/9/2023 10:02 PM    Performed by: Jj Julian MD  Authorized by: Jj Julian MD  Direct patient critical care time: 31 minutes  Total critical care time (exclusive of procedural time) : 31 minutes  Critical care time was exclusive of separately billable procedures and treating other patients and teaching time.  Critical care was necessary to treat or prevent imminent or life-threatening deterioration of the following conditions: cardiac failure.  Critical care was time spent personally by me on the following activities: development of treatment plan with patient or surrogate, discussions with consultants, interpretation of cardiac output measurements, evaluation of patient's response to treatment, examination of patient, obtaining history from patient or surrogate, ordering and performing treatments and interventions, ordering and review of laboratory studies, ordering and review of radiographic studies, pulse oximetry, re-evaluation of patient's condition and review of old charts.      Intubation    Date/Time: 11/9/2023 10:02 PM  Location procedure was performed: [facility] EMERGENCY DEPARTMENT    Performed by: Jj Julian MD  Authorized by: Jj Julian MD  Consent Done: Emergent Situation  Indications: airway protection  Intubation method: video-assisted  Patient status: unconscious  Pretreatment medications: none  Sedation: none needed. no gag reflex.  Paralytic: none  Tube size: 8.0 mm  Tube type: cuffed  Number of attempts: 1  Cricoid pressure: yes  Cords visualized: yes  Breath sounds: clear and equal  Cuff inflated: yes  ETT to teeth: 23 cm  Chest x-ray interpreted by me.  Chest x-ray findings: endotracheal tube in appropriate position  Patient tolerance:  "Patient tolerated the procedure well with no immediate complications  Complications: No  Specimens: No  Implants: No        Labs Reviewed   POCT GLUCOSE - Abnormal; Notable for the following components:       Result Value    POCT Glucose 256 (*)     All other components within normal limits   ISTAT CHEM8 - Abnormal; Notable for the following components:    POC Glucose 255 (*)     POC Creatinine 1.5 (*)     POC TCO2 (MEASURED) 22 (*)     POC Anion Gap 20 (*)     All other components within normal limits          Imaging Results    None          Medications   EPINEPHrine 0.1 mg/mL injection (1 mg Intravenous Given 23)   sodium bicarbonate 8.4 % (1 mEq/mL) injection (50 mEq Intravenous Given 23)     Medical Decision Making  Time of death called at 22:13     Amount and/or Complexity of Data Reviewed  Independent Historian: EMS     Details: EMS reports that pt was loading things into a storage unit with his friend when his friend found him outside of the unit unresponsive. The friend did not know how long pt was down and EMS notes that the friend said the pt has "heart problems." Drugs were found on scene but EMS notes that he is not sure which substance they were. Pt received 4 of Narcan and 4 epi's by EMS. Pt's initial rhythm was asystole and CPR was being given to pt on arrival. EMS notes that CPR began approximately 30 minutes ago.  Labs: ordered.    Risk  Prescription drug management.              Attending Attestation:           Physician Attestation for Scribe:  Physician Attestation Statement for Scribe #1: I, Jj Julian MD, reviewed documentation, as scribed by Eve Christina in my presence, and it is both accurate and complete.                             Clinical Impression:   Final diagnoses:  [I46.9] Cardiac arrest (Primary)        ED Disposition Condition                     Jj Julian MD  23 [id number]    "

## 2023-11-14 ENCOUNTER — DOCUMENTATION ONLY (OUTPATIENT)
Dept: INFECTIOUS DISEASES | Facility: CLINIC | Age: 43
End: 2023-11-14
Payer: MEDICAID

## 2023-11-22 ENCOUNTER — TELEPHONE (OUTPATIENT)
Dept: INFECTIOUS DISEASES | Facility: CLINIC | Age: 43
End: 2023-11-22
Payer: MEDICAID

## 2023-11-22 NOTE — TELEPHONE ENCOUNTER
----- Message from Ana Stroud sent at 2023 10:01 AM CST -----  Regarding: Pt   RAMO PT       Called the pt's significant other to reschedule an appt.   Cyndee stated that the pt passed away on last Thursday.   Can you update this in the chart?

## 2023-11-27 ENCOUNTER — TELEPHONE (OUTPATIENT)
Dept: INFECTIOUS DISEASES | Facility: CLINIC | Age: 43
End: 2023-11-27
Payer: MEDICAID

## 2023-11-27 NOTE — TELEPHONE ENCOUNTER
----- Message from Albertina Doe LPN sent at 2023 10:13 AM CST -----  Regarding: FW: Pt   Patient is . Date of death 2023.   ----- Message -----  From: Ana Stroud  Sent: 2023  10:03 AM CST  To: #  Subject: Pt                                        RAMO PT       Called the pt's significant other to reschedule an appt.   Cyndee stated that the pt passed away on last Thursday.   Can you update this in the chart?

## (undated) DEVICE — CATH IMPULSE MP 5FR 145CM

## (undated) DEVICE — CATH SWAN GANZ STND 7FR

## (undated) DEVICE — Device

## (undated) DEVICE — PAD DEFIB CADENCE ADULT R2

## (undated) DEVICE — GUIDEWIRE INQWIRE SE 3MM JTIP

## (undated) DEVICE — SHEATH INTRODUCER 6FR 11CM

## (undated) DEVICE — KIT MINI STK MAX COAX 5FR 10CM

## (undated) DEVICE — CATH PULMONARY WEDGE PRESS MON

## (undated) DEVICE — SET ANGIO ACIST CVI ANGIOTOUCH

## (undated) DEVICE — SHEATH INTRODUCER 7FR 11CM

## (undated) DEVICE — GUIDEWIRE J TIP FIXED 25X260CM